# Patient Record
Sex: MALE | NOT HISPANIC OR LATINO | Employment: FULL TIME | URBAN - METROPOLITAN AREA
[De-identification: names, ages, dates, MRNs, and addresses within clinical notes are randomized per-mention and may not be internally consistent; named-entity substitution may affect disease eponyms.]

---

## 2020-10-22 ENCOUNTER — OFFICE VISIT (OUTPATIENT)
Dept: INTERNAL MEDICINE | Facility: CLINIC | Age: 30
End: 2020-10-22

## 2020-10-22 VITALS
TEMPERATURE: 96.8 F | DIASTOLIC BLOOD PRESSURE: 86 MMHG | HEIGHT: 73 IN | SYSTOLIC BLOOD PRESSURE: 122 MMHG | HEART RATE: 108 BPM | WEIGHT: 300 LBS | BODY MASS INDEX: 39.76 KG/M2 | RESPIRATION RATE: 16 BRPM | OXYGEN SATURATION: 98 %

## 2020-10-22 DIAGNOSIS — Z11.59 NEED FOR HEPATITIS C SCREENING TEST: ICD-10-CM

## 2020-10-22 DIAGNOSIS — Z00.00 ENCOUNTER FOR PREVENTIVE HEALTH EXAMINATION: Primary | ICD-10-CM

## 2020-10-22 DIAGNOSIS — Z71.1 CONCERN ABOUT STD IN MALE WITHOUT DIAGNOSIS: ICD-10-CM

## 2020-10-22 LAB
CLARITY, POC: CLEAR
COLOR UR: ABNORMAL
PH UR: 6.5 [PH] (ref 5–8)
PROT UR STRIP-MCNC: ABNORMAL MG/DL
RBC # UR STRIP: NEGATIVE /UL
SP GR UR: 1.02 (ref 1–1.03)

## 2020-10-22 PROCEDURE — 99385 PREV VISIT NEW AGE 18-39: CPT | Performed by: INTERNAL MEDICINE

## 2020-10-22 PROCEDURE — 81003 URINALYSIS AUTO W/O SCOPE: CPT | Performed by: INTERNAL MEDICINE

## 2020-10-22 NOTE — PROGRESS NOTES
Subjective   Gallito Doyle is a 30 y.o. male.     Chief Complaint   Patient presents with   • Annual Exam     New Patient         In today for initial visit, transition of care, and annual preventive exam.  Sleep is okay.  Gets 5 to 6 hours per night.  Exercises 3 to 4 days/week.  Energy is good.  Diet is not the best.       The following portions of the patient's history were reviewed and updated as appropriate: allergies, current medications, past social history and problem list.    No outpatient medications have been marked as taking for the 10/22/20 encounter (Office Visit) with Sincere Zhang MD.       Review of Systems   Constitutional: Negative for chills, diaphoresis, fatigue, fever and unexpected weight change.   HENT: Negative for congestion, ear pain, nosebleeds, rhinorrhea, sore throat and voice change.    Eyes: Negative for discharge, itching and visual disturbance.   Respiratory: Negative for cough, chest tightness, shortness of breath and wheezing.    Cardiovascular: Negative for chest pain, palpitations and leg swelling.   Gastrointestinal: Negative for abdominal pain, anal bleeding, constipation, diarrhea, nausea and vomiting.   Endocrine: Negative for cold intolerance, heat intolerance and polyuria.   Genitourinary: Negative for difficulty urinating, dysuria, frequency, hematuria and urgency.   Musculoskeletal: Negative for arthralgias, back pain and myalgias.   Skin: Negative for rash and wound.   Allergic/Immunologic: Positive for environmental allergies.   Neurological: Negative for dizziness, syncope, weakness, light-headedness and headaches.   Hematological: Negative for adenopathy. Does not bruise/bleed easily.   Psychiatric/Behavioral: Negative for confusion, dysphoric mood and sleep disturbance. The patient is not nervous/anxious.        Objective   Vitals:    10/22/20 1411   BP: 122/86   Pulse: 108   Resp: 16   Temp: 96.8 °F (36 °C)   SpO2: 98%      Wt Readings from Last 3  Encounters:   10/22/20 136 kg (300 lb)    Body mass index is 39.58 kg/m².      Physical Exam  Vitals signs and nursing note reviewed.   Constitutional:       Appearance: He is well-developed.   HENT:      Head: Normocephalic and atraumatic.      Right Ear: External ear normal.      Left Ear: External ear normal.      Nose: Nose normal.   Eyes:      General: No scleral icterus.     Conjunctiva/sclera: Conjunctivae normal.      Pupils: Pupils are equal, round, and reactive to light.   Neck:      Musculoskeletal: Normal range of motion and neck supple.      Thyroid: No thyromegaly.      Vascular: No JVD.   Cardiovascular:      Rate and Rhythm: Normal rate and regular rhythm.      Heart sounds: Normal heart sounds. No murmur. No friction rub. No gallop.    Pulmonary:      Effort: Pulmonary effort is normal. No respiratory distress.      Breath sounds: Normal breath sounds. No wheezing or rales.   Abdominal:      General: Bowel sounds are normal. There is no distension.      Palpations: Abdomen is soft. There is no mass.      Tenderness: There is no abdominal tenderness. There is no guarding or rebound.      Hernia: No hernia is present.   Musculoskeletal: Normal range of motion.   Lymphadenopathy:      Cervical: No cervical adenopathy.   Skin:     General: Skin is warm and dry.   Neurological:      Mental Status: He is alert and oriented to person, place, and time.      Deep Tendon Reflexes: Reflexes are normal and symmetric. Reflexes normal.   Psychiatric:         Behavior: Behavior normal.         Thought Content: Thought content normal.         Judgment: Judgment normal.           Problems Addressed this Visit     None      Visit Diagnoses     Encounter for preventive health examination    -  Primary    Relevant Orders    Glucose, Random    Lipid Panel With / Chol / HDL Ratio    Concern about STD in male without diagnosis        Relevant Orders    HIV-1 / O / 2 Ag / Antibody 4th Generation    RPR    Chlamydia  trachomatis, Neisseria gonorrhoeae, PCR - Urine, Urine, Clean Catch    Need for hepatitis C screening test        Relevant Orders    Hepatitis C Antibody      Diagnoses       Codes Comments    Encounter for preventive health examination    -  Primary ICD-10-CM: Z00.00  ICD-9-CM: V70.0     Concern about STD in male without diagnosis     ICD-10-CM: Z71.1  ICD-9-CM: V65.5     Need for hepatitis C screening test     ICD-10-CM: Z11.59  ICD-9-CM: V73.89         Assessment/Plan   In for initial visit, transition of care, and annual preventive exam.  Overall health is excellent.  He does smoke and is already accumulated a 13-pack-year smoking history.  We discussed quitting today.  He is not sure about last TD AP but had a child 4 years ago.  He will check the records at [place].  Flu shot is updated today.  Lab work will include glucose, lipids, and hep C antibody.  He also would like some STD checking and we will send off a panel.  No specific exposures.  Just worries.  He has noticed a scattering of tiny lesions along the distal shaft of his penis.  They are depigmented but protrudes slightly.  They may well be condylomatous.  Will refer to urology.  We will plan to follow-up annually.     PPE today includes face mask and eye shield.         Dragon disclaimer:   Much of this encounter note is an electronic transcription/translation of spoken language to printed text. The electronic translation of spoken language may permit erroneous, or at times, nonsensical words or phrases to be inadvertently transcribed; Although I have reviewed the note for such errors, some may still exist.

## 2020-10-22 NOTE — PATIENT INSTRUCTIONS
Exercising to Stay Healthy  To become healthy and stay healthy, it is recommended that you do moderate-intensity and vigorous-intensity exercise. You can tell that you are exercising at a moderate intensity if your heart starts beating faster and you start breathing faster but can still hold a conversation. You can tell that you are exercising at a vigorous intensity if you are breathing much harder and faster and cannot hold a conversation while exercising.  Exercising regularly is important. It has many health benefits, such as:  · Improving overall fitness, flexibility, and endurance.  · Increasing bone density.  · Helping with weight control.  · Decreasing body fat.  · Increasing muscle strength.  · Reducing stress and tension.  · Improving overall health.  How often should I exercise?  Choose an activity that you enjoy, and set realistic goals. Your health care provider can help you make an activity plan that works for you.  Exercise regularly as told by your health care provider. This may include:  · Doing strength training two times a week, such as:  ? Lifting weights.  ? Using resistance bands.  ? Push-ups.  ? Sit-ups.  ? Yoga.  · Doing a certain intensity of exercise for a given amount of time. Choose from these options:  ? A total of 150 minutes of moderate-intensity exercise every week.  ? A total of 75 minutes of vigorous-intensity exercise every week.  ? A mix of moderate-intensity and vigorous-intensity exercise every week.  Children, pregnant women, people who have not exercised regularly, people who are overweight, and older adults may need to talk with a health care provider about what activities are safe to do. If you have a medical condition, be sure to talk with your health care provider before you start a new exercise program.  What are some exercise ideas?  Moderate-intensity exercise ideas include:  · Walking 1 mile (1.6 km) in about 15  minutes.  · Biking.  · Hiking.  · Golfing.  · Dancing.  · Water aerobics.  Vigorous-intensity exercise ideas include:  · Walking 4.5 miles (7.2 km) or more in about 1 hour.  · Jogging or running 5 miles (8 km) in about 1 hour.  · Biking 10 miles (16.1 km) or more in about 1 hour.  · Lap swimming.  · Roller-skating or in-line skating.  · Cross-country skiing.  · Vigorous competitive sports, such as football, basketball, and soccer.  · Jumping rope.  · Aerobic dancing.  What are some everyday activities that can help me to get exercise?  · Yard work, such as:  ? Pushing a .  ? Raking and bagging leaves.  · Washing your car.  · Pushing a stroller.  · Shoveling snow.  · Gardening.  · Washing windows or floors.  How can I be more active in my day-to-day activities?  · Use stairs instead of an elevator.  · Take a walk during your lunch break.  · If you drive, park your car farther away from your work or school.  · If you take public transportation, get off one stop early and walk the rest of the way.  · Stand up or walk around during all of your indoor phone calls.  · Get up, stretch, and walk around every 30 minutes throughout the day.  · Enjoy exercise with a friend. Support to continue exercising will help you keep a regular routine of activity.  What guidelines can I follow while exercising?  · Before you start a new exercise program, talk with your health care provider.  · Do not exercise so much that you hurt yourself, feel dizzy, or get very short of breath.  · Wear comfortable clothes and wear shoes with good support.  · Drink plenty of water while you exercise to prevent dehydration or heat stroke.  · Work out until your breathing and your heartbeat get faster.  Where to find more information  · [place] Department of Health and Human Services: www.hhs.gov  · Centers for Disease Control and Prevention (CDC): www.cdc.gov  Summary  · Exercising regularly is important. It will improve your overall fitness,  flexibility, and endurance.  · Regular exercise also will improve your overall health. It can help you control your weight, reduce stress, and improve your bone density.  · Do not exercise so much that you hurt yourself, feel dizzy, or get very short of breath.  · Before you start a new exercise program, talk with your health care provider.  This information is not intended to replace advice given to you by your health care provider. Make sure you discuss any questions you have with your health care provider.  Document Released: 01/20/2012 Document Revised: 11/30/2018 Document Reviewed: 11/08/2018  [employer] Patient Education © 2020 [employer]  Calorie Counting for Weight Loss  Calories are units of energy. Your body needs a certain amount of calories from food to keep you going throughout the day. When you eat more calories than your body needs, your body stores the extra calories as fat. When you eat fewer calories than your body needs, your body burns fat to get the energy it needs.  Calorie counting means keeping track of how many calories you eat and drink each day. Calorie counting can be helpful if you need to lose weight. If you make sure to eat fewer calories than your body needs, you should lose weight. Ask your health care provider what a healthy weight is for you.  For calorie counting to work, you will need to eat the right number of calories in a day in order to lose a healthy amount of weight per week. A dietitian can help you determine how many calories you need in a day and will give you suggestions on how to reach your calorie goal.  · A healthy amount of weight to lose per week is usually 1-2 lb (0.5-0.9 kg). This usually means that your daily calorie intake should be reduced by 500-750 calories.  · Eating 1,200 - 1,500 calories per day can help most women lose weight.  · Eating 1,500 - 1,800 calories per day can help most men lose weight.  What is my plan?  My goal is to have __________  calories per day.  If I have this many calories per day, I should lose around __________ pounds per week.  What do I need to know about calorie counting?  In order to meet your daily calorie goal, you will need to:  · Find out how many calories are in each food you would like to eat. Try to do this before you eat.  · Decide how much of the food you plan to eat.  · Write down what you ate and how many calories it had. Doing this is called keeping a food log.  To successfully lose weight, it is important to balance calorie counting with a healthy lifestyle that includes regular activity. Aim for 150 minutes of moderate exercise (such as walking) or 75 minutes of vigorous exercise (such as running) each week.  Where do I find calorie information?    The number of calories in a food can be found on a Nutrition Facts label. If a food does not have a Nutrition Facts label, try to look up the calories online or ask your dietitian for help.  Remember that calories are listed per serving. If you choose to have more than one serving of a food, you will have to multiply the calories per serving by the amount of servings you plan to eat. For example, the label on a package of bread might say that a serving size is 1 slice and that there are 90 calories in a serving. If you eat 1 slice, you will have eaten 90 calories. If you eat 2 slices, you will have eaten 180 calories.  How do I keep a food log?  Immediately after each meal, record the following information in your food log:  · What you ate. Don't forget to include toppings, sauces, and other extras on the food.  · How much you ate. This can be measured in cups, ounces, or number of items.  · How many calories each food and drink had.  · The total number of calories in the meal.  Keep your food log near you, such as in a small notebook in your pocket, or use a mobile jayson or website. Some programs will calculate calories for you and show you how many calories you have left  "for the day to meet your goal.  What are some calorie counting tips?    · Use your calories on foods and drinks that will fill you up and not leave you hungry:  ? Some examples of foods that fill you up are nuts and nut butters, vegetables, lean proteins, and high-fiber foods like whole grains. High-fiber foods are foods with more than 5 g fiber per serving.  ? Drinks such as sodas, specialty coffee drinks, alcohol, and juices have a lot of calories, yet do not fill you up.  · Eat nutritious foods and avoid empty calories. Empty calories are calories you get from foods or beverages that do not have many vitamins or protein, such as candy, sweets, and soda. It is better to have a nutritious high-calorie food (such as an avocado) than a food with few nutrients (such as a bag of chips).  · Know how many calories are in the foods you eat most often. This will help you calculate calorie counts faster.  · Pay attention to calories in drinks. Low-calorie drinks include water and unsweetened drinks.  · Pay attention to nutrition labels for \"low fat\" or \"fat free\" foods. These foods sometimes have the same amount of calories or more calories than the full fat versions. They also often have added sugar, starch, or salt, to make up for flavor that was removed with the fat.  · Find a way of tracking calories that works for you. Get creative. Try different apps or programs if writing down calories does not work for you.  What are some portion control tips?  · Know how many calories are in a serving. This will help you know how many servings of a certain food you can have.  · Use a measuring cup to measure serving sizes. You could also try weighing out portions on a kitchen scale. With time, you will be able to estimate serving sizes for some foods.  · Take some time to put servings of different foods on your favorite plates, bowls, and cups so you know what a serving looks like.  · Try not to eat straight from a bag or box. " "Doing this can lead to overeating. Put the amount you would like to eat in a cup or on a plate to make sure you are eating the right portion.  · Use smaller plates, glasses, and bowls to prevent overeating.  · Try not to multitask (for example, watch TV or use your computer) while eating. If it is time to eat, sit down at a table and enjoy your food. This will help you to know when you are full. It will also help you to be aware of what you are eating and how much you are eating.  What are tips for following this plan?  Reading food labels  · Check the calorie count compared to the serving size. The serving size may be smaller than what you are used to eating.  · Check the source of the calories. Make sure the food you are eating is high in vitamins and protein and low in saturated and trans fats.  Shopping  · Read nutrition labels while you shop. This will help you make healthy decisions before you decide to purchase your food.  · Make a grocery list and stick to it.  Cooking  · Try to cook your favorite foods in a healthier way. For example, try baking instead of frying.  · Use low-fat dairy products.  Meal planning  · Use more fruits and vegetables. Half of your plate should be fruits and vegetables.  · Include lean proteins like poultry and fish.  How do I count calories when eating out?  · Ask for smaller portion sizes.  · Consider sharing an entree and sides instead of getting your own entree.  · If you get your own entree, eat only half. Ask for a box at the beginning of your meal and put the rest of your entree in it so you are not tempted to eat it.  · If calories are listed on the menu, choose the lower calorie options.  · Choose dishes that include vegetables, fruits, whole grains, low-fat dairy products, and lean protein.  · Choose items that are boiled, broiled, grilled, or steamed. Stay away from items that are buttered, battered, fried, or served with cream sauce. Items labeled \"crispy\" are usually " fried, unless stated otherwise.  · Choose water, low-fat milk, unsweetened iced tea, or other drinks without added sugar. If you want an alcoholic beverage, choose a lower calorie option such as a glass of wine or light beer.  · Ask for dressings, sauces, and syrups on the side. These are usually high in calories, so you should limit the amount you eat.  · If you want a salad, choose a garden salad and ask for grilled meats. Avoid extra toppings like hearn, cheese, or fried items. Ask for the dressing on the side, or ask for olive oil and vinegar or lemon to use as dressing.  · Estimate how many servings of a food you are given. For example, a serving of cooked rice is ½ cup or about the size of half a baseball. Knowing serving sizes will help you be aware of how much food you are eating at restaurants. The list below tells you how big or small some common portion sizes are based on everyday objects:  ? 1 oz--4 stacked dice.  ? 3 oz--1 deck of cards.  ? 1 tsp--1 die.  ? 1 Tbsp--½ a ping-pong ball.  ? 2 Tbsp--1 ping-pong ball.  ? ½ cup--½ baseball.  ? 1 cup--1 baseball.  Summary  · Calorie counting means keeping track of how many calories you eat and drink each day. If you eat fewer calories than your body needs, you should lose weight.  · A healthy amount of weight to lose per week is usually 1-2 lb (0.5-0.9 kg). This usually means reducing your daily calorie intake by 500-750 calories.  · The number of calories in a food can be found on a Nutrition Facts label. If a food does not have a Nutrition Facts label, try to look up the calories online or ask your dietitian for help.  · Use your calories on foods and drinks that will fill you up, and not on foods and drinks that will leave you hungry.  · Use smaller plates, glasses, and bowls to prevent overeating.  This information is not intended to replace advice given to you by your health care provider. Make sure you discuss any questions you have with your health care  "provider.  Document Released: 12/18/2006 Document Revised: 09/06/2019 Document Reviewed: 11/17/2017  [employer] Patient Education © 2020 [employer]  BMI for Adults  What is BMI?  Body mass index (BMI) is a number that is calculated from a person's weight and height. BMI can help estimate how much of a person's weight is composed of fat. BMI does not measure body fat directly. Rather, it is an alternative to procedures that directly measure body fat, which can be difficult and expensive.  BMI can help identify people who may be at higher risk for certain medical problems.  What are BMI measurements used for?  BMI is used as a screening tool to identify possible weight problems. It helps determine whether a person is obese, overweight, a healthy weight, or underweight.  BMI is useful for:  · Identifying a weight problem that may be related to a medical condition or may increase the risk for medical problems.  · Promoting changes, such as changes in diet and exercise, to help reach a healthy weight. BMI screening can be repeated to see if these changes are working.  How is BMI calculated?  BMI involves measuring your weight in relation to your height. Both height and weight are measured, and the BMI is calculated from those numbers. This can be done either in English ([place] or metric measurements. Note that charts and online BMI calculators are available to help you find your BMI quickly and easily without having to do these calculations yourself.  To calculate your BMI in English ([place] measurements:    1. Measure your weight in pounds (lb).  2. Multiply the number of pounds by 703.  ? For example, for a person who weighs 180 lb, multiply that number by 703, which equals 126,540.  3. Measure your height in inches. Then multiply that number by itself to get a measurement called \"inches squared.\"  ? For example, for a person who is 70 inches tall, the \"inches squared\" measurement is 70 inches x 70 inches, which equals " "4,900 inches squared.  4. Divide the total from step 2 (number of lb x 703) by the total from step 3 (inches squared): 126,540 ÷ 4,900 = 25.8. This is your BMI.  To calculate your BMI in metric measurements:  1. Measure your weight in kilograms (kg).  2. Measure your height in meters (m). Then multiply that number by itself to get a measurement called \"meters squared.\"  ? For example, for a person who is 1.75 m tall, the \"meters squared\" measurement is 1.75 m x 1.75 m, which is equal to 3.1 meters squared.  3. Divide the number of kilograms (your weight) by the meters squared number. In this example: 70 ÷ 3.1 = 22.6. This is your BMI.  What do the results mean?  BMI charts are used to identify whether you are underweight, normal weight, overweight, or obese. The following guidelines will be used:  · Underweight: BMI less than 18.5.  · Normal weight: BMI between 18.5 and 24.9.  · Overweight: BMI between 25 and 29.9.  · Obese: BMI of 30 or above.  Keep these notes in mind:  · Weight includes both fat and muscle, so someone with a muscular build, such as an athlete, may have a BMI that is higher than 24.9. In cases like these, BMI is not an accurate measure of body fat.  · To determine if excess body fat is the cause of a BMI of 25 or higher, further assessments may need to be done by a health care provider.  · BMI is usually interpreted in the same way for men and women.  Where to find more information  For more information about BMI, including tools to quickly calculate your BMI, go to these websites:  · Centers for Disease Control and Prevention: www.cdc.gov  · American Heart Association: www.heart.org  · National Heart, Lung, and [facility] [place]: www.nhlbi.nih.gov  Summary  · Body mass index (BMI) is a number that is calculated from a person's weight and height.  · BMI may help estimate how much of a person's weight is composed of fat. BMI can help identify those who may be at higher risk for certain medical " problems.  · BMI can be measured using English measurements or metric measurements.  · BMI charts are used to identify whether you are underweight, normal weight, overweight, or obese.  This information is not intended to replace advice given to you by your health care provider. Make sure you discuss any questions you have with your health care provider.  Document Released: 08/29/2005 Document Revised: 09/09/2020 Document Reviewed: 07/17/2020  [employer] Patient Education © 2020 [employer]

## 2020-10-24 LAB
C TRACH RRNA SPEC QL NAA+PROBE: NEGATIVE
CHOLEST SERPL-MCNC: 183 MG/DL (ref 0–200)
CHOLEST/HDLC SERPL: 4.46 {RATIO}
GLUCOSE SERPL-MCNC: 99 MG/DL (ref 65–99)
HCV AB S/CO SERPL IA: <0.1 S/CO RATIO (ref 0–0.9)
HDLC SERPL-MCNC: 41 MG/DL (ref 40–60)
HIV 1+2 AB+HIV1 P24 AG SERPL QL IA: NON REACTIVE
LDLC SERPL CALC-MCNC: 101 MG/DL (ref 0–100)
N GONORRHOEA RRNA SPEC QL NAA+PROBE: NEGATIVE
RPR SER QL: NORMAL
TRIGL SERPL-MCNC: 237 MG/DL (ref 0–150)
VLDLC SERPL CALC-MCNC: 41 MG/DL (ref 5–40)

## 2020-11-17 ENCOUNTER — LAB REQUISITION (OUTPATIENT)
Dept: LAB | Facility: HOSPITAL | Age: 30
End: 2020-11-17

## 2020-11-17 DIAGNOSIS — N50.9 DISORDER OF MALE GENITAL ORGANS, UNSPECIFIED: ICD-10-CM

## 2020-11-17 PROCEDURE — 88305 TISSUE EXAM BY PATHOLOGIST: CPT | Performed by: UROLOGY

## 2020-11-18 LAB
LAB AP CASE REPORT: NORMAL
LAB AP DIAGNOSIS COMMENT: NORMAL
PATH REPORT.FINAL DX SPEC: NORMAL
PATH REPORT.GROSS SPEC: NORMAL

## 2021-06-11 ENCOUNTER — HOSPITAL ENCOUNTER (EMERGENCY)
Facility: HOSPITAL | Age: 31
Discharge: HOME | End: 2021-06-11
Attending: EMERGENCY MEDICINE | Admitting: EMERGENCY MEDICINE

## 2021-06-11 VITALS
BODY MASS INDEX: 39.58 KG/M2 | TEMPERATURE: 98 F | RESPIRATION RATE: 20 BRPM | HEART RATE: 88 BPM | OXYGEN SATURATION: 98 % | DIASTOLIC BLOOD PRESSURE: 80 MMHG | HEIGHT: 73 IN | SYSTOLIC BLOOD PRESSURE: 144 MMHG

## 2021-06-11 DIAGNOSIS — S86.112D GASTROCNEMIUS TEAR, LEFT, SUBSEQUENT ENCOUNTER: Primary | ICD-10-CM

## 2021-06-11 LAB
ANION GAP SERPL CALCULATED.3IONS-SCNC: 9.4 MMOL/L (ref 5–15)
BASOPHILS # BLD AUTO: 0.04 10*3/MM3 (ref 0–0.2)
BASOPHILS NFR BLD AUTO: 0.4 % (ref 0–1.5)
BUN SERPL-MCNC: 15 MG/DL (ref 6–20)
BUN/CREAT SERPL: 18.3 (ref 7–25)
CALCIUM SPEC-SCNC: 9.4 MG/DL (ref 8.6–10.5)
CHLORIDE SERPL-SCNC: 104 MMOL/L (ref 98–107)
CO2 SERPL-SCNC: 25.6 MMOL/L (ref 22–29)
CREAT SERPL-MCNC: 0.82 MG/DL (ref 0.76–1.27)
D DIMER PPP FEU-MCNC: <0.27 MCGFEU/ML (ref 0–0.49)
DEPRECATED RDW RBC AUTO: 43.5 FL (ref 37–54)
EOSINOPHIL # BLD AUTO: 0.25 10*3/MM3 (ref 0–0.4)
EOSINOPHIL NFR BLD AUTO: 2.3 % (ref 0.3–6.2)
ERYTHROCYTE [DISTWIDTH] IN BLOOD BY AUTOMATED COUNT: 13.7 % (ref 12.3–15.4)
GFR SERPL CREATININE-BSD FRML MDRD: 110 ML/MIN/1.73
GFR SERPL CREATININE-BSD FRML MDRD: 134 ML/MIN/1.73
GLUCOSE SERPL-MCNC: 90 MG/DL (ref 65–99)
HCT VFR BLD AUTO: 42.5 % (ref 37.5–51)
HGB BLD-MCNC: 14.2 G/DL (ref 13–17.7)
IMM GRANULOCYTES # BLD AUTO: 0.04 10*3/MM3 (ref 0–0.05)
IMM GRANULOCYTES NFR BLD AUTO: 0.4 % (ref 0–0.5)
LYMPHOCYTES # BLD AUTO: 2.83 10*3/MM3 (ref 0.7–3.1)
LYMPHOCYTES NFR BLD AUTO: 25.6 % (ref 19.6–45.3)
MCH RBC QN AUTO: 29 PG (ref 26.6–33)
MCHC RBC AUTO-ENTMCNC: 33.4 G/DL (ref 31.5–35.7)
MCV RBC AUTO: 86.9 FL (ref 79–97)
MONOCYTES # BLD AUTO: 0.61 10*3/MM3 (ref 0.1–0.9)
MONOCYTES NFR BLD AUTO: 5.5 % (ref 5–12)
NEUTROPHILS NFR BLD AUTO: 65.8 % (ref 42.7–76)
NEUTROPHILS NFR BLD AUTO: 7.29 10*3/MM3 (ref 1.7–7)
NRBC BLD AUTO-RTO: 0 /100 WBC (ref 0–0.2)
PLATELET # BLD AUTO: 239 10*3/MM3 (ref 140–450)
PMV BLD AUTO: 10.6 FL (ref 6–12)
POTASSIUM SERPL-SCNC: 3.9 MMOL/L (ref 3.5–5.2)
RBC # BLD AUTO: 4.89 10*6/MM3 (ref 4.14–5.8)
SODIUM SERPL-SCNC: 139 MMOL/L (ref 136–145)
WBC # BLD AUTO: 11.06 10*3/MM3 (ref 3.4–10.8)

## 2021-06-11 PROCEDURE — 85379 FIBRIN DEGRADATION QUANT: CPT | Performed by: PHYSICIAN ASSISTANT

## 2021-06-11 PROCEDURE — 80048 BASIC METABOLIC PNL TOTAL CA: CPT | Performed by: PHYSICIAN ASSISTANT

## 2021-06-11 PROCEDURE — 85025 COMPLETE CBC W/AUTO DIFF WBC: CPT | Performed by: PHYSICIAN ASSISTANT

## 2021-06-11 PROCEDURE — 36415 COLL VENOUS BLD VENIPUNCTURE: CPT

## 2021-06-11 PROCEDURE — 99282 EMERGENCY DEPT VISIT SF MDM: CPT

## 2021-06-11 RX ORDER — DICLOFENAC SODIUM 75 MG/1
75 TABLET, DELAYED RELEASE ORAL 2 TIMES DAILY
Qty: 14 TABLET | Refills: 0 | Status: SHIPPED | OUTPATIENT
Start: 2021-06-11 | End: 2021-06-18

## 2021-06-12 NOTE — ED PROVIDER NOTES
Pt presents to the ED complaining of left calf pain.  He states he had some pain in his left calf on and off for the past 2 weeks but tonight when he was playing basketball he felt like something hit him in the back of the leg however there is nothing around him.  Now he states it hurts to ambulate.  He went to the [facility] who told him he had a blood clot and sent him to the emergency room for evaluation.    On exam, pt is alert and orient x3 no acute distress  Patient has point tenderness over the medial head of his left gastrocnemius on examination  Patient's Achilles tendon is intact  The patient has no calf swelling  His left foot is neurovascular intact    I agree with midlevel plan to check a D-dimer    PPE  Pt does not present with symptoms for COVID19; however, I was wearing a mask and goggles throughout all patient interaction.    The patient's D-dimer is negative.  I advised the patient he has a gastrocnemius tear.  We will place an Ace wrap putting on crutches give him NSAIDs and have him follow-up with orthopedics.  Patient understands and agrees with the plan.    The DAVID and I have discussed this patient's history, physical exam, and treatment plan.  I have reviewed the documentation and personally had a face to face interaction with the patient. I affirm the documentation and agree with the treatment and plan.  The attached note describes my personal findings.           John Bragg MD  06/11/21 7283

## 2021-06-12 NOTE — ED TRIAGE NOTES
Patient reports left leg pain x 2 weeks much worse today with NKI. Patient denies shortness of breath or chest pain.     Patient placed in mask upon arrival, Triage staff wearing appropriate PPE.

## 2021-06-12 NOTE — DISCHARGE INSTRUCTIONS
Please take the prescription as prescribed.  Ice and elevate your legs 2-3 times a day.  Use your crutches to help alleviate your pain.  Follow-up with Dr. Tinoco if your symptoms continue.

## 2021-06-12 NOTE — ED PROVIDER NOTES
EMERGENCY DEPARTMENT ENCOUNTER    Room Number:  35/35  Date seen:  6/12/2021  Time seen: 21:51 EDT  PCP: Sincere Zhang MD  Historian: Patient    HPI:  Chief complaint: Leg pain  A complete HPI/ROS/PMH/PSH/SH/FH are unobtainable due to: None  Context:Gallito Doyle is a 30 y.o. male, who presents to the ED with c/o intermittent left calf pain for 2 weeks.  He was playing basketball earlier today when the pain started to get worse and noticed some mild swelling around the area.  He went to urgent care and was sent to the ER for further evaluation for DVT.  Patient denies taking any hormones, denies any prolonged immobilization, denies any history of blood clots.  Patient denies any chest pain or shortness of breath.  When he bears weight the pain is a 9 of 10 pain scale.    Patient was placed in face mask in first look. Patient was wearing facemask when I entered the room and throughout our encounter. I wore full protective equipment throughout this patient encounter including a face mask, goggles, and gloves. Hand hygiene was performed before donning protective equipment and after removal when leaving the room.      MEDICAL RECORD REVIEW      ALLERGIES  Patient has no known allergies.    PAST MEDICAL HISTORY  Active Ambulatory Problems     Diagnosis Date Noted   • No Active Ambulatory Problems     Resolved Ambulatory Problems     Diagnosis Date Noted   • No Resolved Ambulatory Problems     No Additional Past Medical History       PAST SURGICAL HISTORY  No past surgical history on file.    FAMILY HISTORY  Family History   Problem Relation Age of Onset   • No Known Problems Sister    • No Known Problems Brother    • No Known Problems Daughter    • No Known Problems Daughter        SOCIAL HISTORY  Social History     Socioeconomic History   • Marital status: Single     Spouse name: Not on file   • Number of children: Not on file   • Years of education: Not on file   • Highest education level: Not on file   Tobacco  Use   • Smoking status: Current Every Day Smoker     Packs/day: 1.00     Types: Cigarettes     Start date: 2007   • Smokeless tobacco: Never Used   Substance and Sexual Activity   • Alcohol use: Yes   • Drug use: Not Currently       REVIEW OF SYSTEMS  Review of Systems    All systems reviewed and negative except for those discussed in HPI.     PHYSICAL EXAM    ED Triage Vitals [06/11/21 2023]   Temp Heart Rate Resp BP SpO2   97.8 °F (36.6 °C) 90 17 -- 98 %      Temp src Heart Rate Source Patient Position BP Location FiO2 (%)   Tympanic -- -- -- --     Physical Exam    I have reviewed the triage vital signs and nursing notes.      GENERAL: not distressed  HENT: nares patent  EYES: no scleral icterus  NECK: no ROM limitations  CV: regular rhythm, regular rate  RESPIRATORY: normal effort  ABDOMEN: soft, nontender  : deferred  MUSCULOSKELETAL: no deformity, there is point tenderness over the medial head of his left gastrocnemius on examination, left lower extremity Achilles tendon is intact, 2+ dorsalis pedal pulses to the left lower extremity  NEURO: alert, moves all extremities, follows commands  SKIN: warm, dry    LAB RESULTS  Recent Results (from the past 24 hour(s))   Basic Metabolic Panel    Collection Time: 06/11/21 10:07 PM    Specimen: Blood   Result Value Ref Range    Glucose 90 65 - 99 mg/dL    BUN 15 6 - 20 mg/dL    Creatinine 0.82 0.76 - 1.27 mg/dL    Sodium 139 136 - 145 mmol/L    Potassium 3.9 3.5 - 5.2 mmol/L    Chloride 104 98 - 107 mmol/L    CO2 25.6 22.0 - 29.0 mmol/L    Calcium 9.4 8.6 - 10.5 mg/dL    eGFR  African Amer 134 >60 mL/min/1.73    eGFR Non African Amer 110 >60 mL/min/1.73    BUN/Creatinine Ratio 18.3 7.0 - 25.0    Anion Gap 9.4 5.0 - 15.0 mmol/L   D-dimer, Quantitative    Collection Time: 06/11/21 10:07 PM    Specimen: Blood   Result Value Ref Range    D-Dimer, Quantitative <0.27 0.00 - 0.49 MCGFEU/mL   CBC Auto Differential    Collection Time: 06/11/21 10:07 PM    Specimen: Blood    Result Value Ref Range    WBC 11.06 (H) 3.40 - 10.80 10*3/mm3    RBC 4.89 4.14 - 5.80 10*6/mm3    Hemoglobin 14.2 13.0 - 17.7 g/dL    Hematocrit 42.5 37.5 - 51.0 %    MCV 86.9 79.0 - 97.0 fL    MCH 29.0 26.6 - 33.0 pg    MCHC 33.4 31.5 - 35.7 g/dL    RDW 13.7 12.3 - 15.4 %    RDW-SD 43.5 37.0 - 54.0 fl    MPV 10.6 6.0 - 12.0 fL    Platelets 239 140 - 450 10*3/mm3    Neutrophil % 65.8 42.7 - 76.0 %    Lymphocyte % 25.6 19.6 - 45.3 %    Monocyte % 5.5 5.0 - 12.0 %    Eosinophil % 2.3 0.3 - 6.2 %    Basophil % 0.4 0.0 - 1.5 %    Immature Grans % 0.4 0.0 - 0.5 %    Neutrophils, Absolute 7.29 (H) 1.70 - 7.00 10*3/mm3    Lymphocytes, Absolute 2.83 0.70 - 3.10 10*3/mm3    Monocytes, Absolute 0.61 0.10 - 0.90 10*3/mm3    Eosinophils, Absolute 0.25 0.00 - 0.40 10*3/mm3    Basophils, Absolute 0.04 0.00 - 0.20 10*3/mm3    Immature Grans, Absolute 0.04 0.00 - 0.05 10*3/mm3    nRBC 0.0 0.0 - 0.2 /100 WBC         RADIOLOGY RESULTS  No orders to display         PROGRESS, DATA ANALYSIS, CONSULTS AND MEDICAL DECISION MAKING  All labs have been independently reviewed by me.  All radiology studies have been reviewed by me and discussed with radiologist dictating the report. Discussion below represents my analysis of pertinent findings related to patient's condition, differential diagnosis, treatment plan and final disposition.          The differential diagnosis include but are not limited to gastrocnemius tear, fracture, Achilles tendon rupture.       Reviewed pt's history and workup with Dr. Bragg.  After a bedside evaluation, Dr. Bragg agrees with the plan of care.    (FOR DISCHARGE)The patient's history, physical exam, and lab findings were discussed with the physician, who also performed a face to face history and physical exam.  I discussed all results and noted any abnormalities with patient.  Discussed absoute need to recheck abnormalities with their family physician.  I answered any of the patient's questions.  Discussed  "plan for discharge, as there is no emergent indication for admission.  Pt is agreeable and understands need for follow up and repeat testing.  Pt is aware that discharge does not mean that nothing is wrong but it indicates no emergency is present and they must continue care with their family physician.  Pt is discharged with instructions to follow up with primary care doctor to have their blood pressure rechecked.       Disposition vitals:  /80   Pulse 88   Temp 98 °F (36.7 °C)   Resp 20   Ht 185.4 cm (73\")   SpO2 98%   BMI 39.58 kg/m²       DIAGNOSIS  Final diagnoses:   Gastrocnemius tear, left, subsequent encounter       FOLLOW UP   Alexandre Tinoco MD  [address] [id number] [phone]    Call in 1 day      [facility] Emergency Department  [address] [facility] [address] [phone]    If symptoms worsen, As needed         Cheryl Bravo PA-C  06/12/21 0613    "

## 2021-06-16 ENCOUNTER — OFFICE VISIT (OUTPATIENT)
Dept: ORTHOPEDIC SURGERY | Facility: CLINIC | Age: 31
End: 2021-06-16

## 2021-06-16 VITALS — WEIGHT: 290 LBS | BODY MASS INDEX: 38.43 KG/M2 | TEMPERATURE: 96.6 F | HEIGHT: 73 IN

## 2021-06-16 DIAGNOSIS — M79.662 PAIN IN LEFT LOWER LEG: Primary | ICD-10-CM

## 2021-06-16 PROCEDURE — 99202 OFFICE O/P NEW SF 15 MIN: CPT | Performed by: ORTHOPAEDIC SURGERY

## 2021-06-16 PROCEDURE — 73590 X-RAY EXAM OF LOWER LEG: CPT | Performed by: ORTHOPAEDIC SURGERY

## 2021-06-16 RX ORDER — IBUPROFEN 800 MG/1
TABLET ORAL
COMMUNITY
Start: 2021-06-13 | End: 2022-04-18

## 2021-06-16 NOTE — PROGRESS NOTES
"  Patient: Gallito Doyle    YOB: 1990    Medical Record Number: [id number]    Chief Complaints: Left calf injury    History of Present Illness:     30 y.o. male patient who presents for his left calf.  2 weeks ago he was playing basketball.  He went up for a rebound and when he came down he tells me \"it felt like I got hit\".  He felt a sharp pain in the back of his calf.  He could hardly bear weight for a period of several days afterwards.  Today, his pain is much better.  Current pain is mild and aching.  Localizes the pain to the posterior medial calf.  He is able to ambulate without any assist device.    Allergies: No Known Allergies    Home Medications:      Current Outpatient Medications:   •  diclofenac (VOLTAREN) 75 MG EC tablet, Take 1 tablet by mouth 2 (Two) Times a Day for 7 days., Disp: 14 tablet, Rfl: 0  •  ibuprofen (ADVIL,MOTRIN) 800 MG tablet, , Disp: , Rfl:     History reviewed. No pertinent past medical history.    History reviewed. No pertinent surgical history.    Social History     Occupational History   • Not on file   Tobacco Use   • Smoking status: Current Every Day Smoker     Packs/day: 1.00     Types: Cigarettes     Start date: 2007   • Smokeless tobacco: Never Used   Substance and Sexual Activity   • Alcohol use: Yes   • Drug use: Not Currently   • Sexual activity: Not on file      Social History     Social History Narrative   • Not on file       Family History   Problem Relation Age of Onset   • No Known Problems Sister    • No Known Problems Brother    • No Known Problems Daughter    • No Known Problems Daughter        Review of Systems:      Constitutional: Denies fever, shaking or chills   Eyes: Denies change in visual acuity   HEENT: Denies nasal congestion or sore throat   Respiratory: Denies cough or shortness of breath   Cardiovascular: Denies chest pain or edema  Endocrine: Denies tremors, palpitations, intolerance of heat or cold, polyuria, polydipsia.  GI: " "Denies abdominal pain, nausea, vomiting, bloody stools or diarrhea  : Denies frequency, urgency, incontinence, retention, or nocturia.  Musculoskeletal: Denies numbness, tingling or loss of motor function except as above  Integument: Denies rash, lesion or ulceration   Neurologic: Denies headache or focal weakness, deficits  Heme: Denies spontaneous or excessive bleeding, epistaxis, hematuria, melena, fatigue, enlarged or tender lymph nodes.      All other pertinent positives and negatives as noted above in HPI.    Physical Exam: 30 y.o. male    Vitals:    06/16/21 1506   Temp: 96.6 °F (35.9 °C)   Weight: 132 kg (290 lb)   Height: 185.4 cm (73\")       General:  Patient is awake and alert.  Appears in no acute distress or discomfort.    Psych:  Affect and demeanor are appropriate.    Eyes:  Conjunctiva and sclera appear grossly normal.  Eyes track well and EOM seem to be intact.    Ears:  No gross abnormalities.  Hearing adequate for the exam.    Cardiovascular:  Regular rate and rhythm.    Lungs:  Good chest expansion.  Breathing unlabored.    Lymph:  No palpable masses or adenopathy in the left lower extremity    Extremities: Left leg is examined.  Skin at his calf is benign.  No edema or ecchymosis.  Mild tenderness over the lower muscle body of the medial head of the gastroc.  No palpable defect or step-off.  His Achilles is palpably intact.  His foot moves normally with a Bhat test.  He can perform a single-leg toe rise although it is uncomfortable for him.  He has flatfoot deformity but it is passively correctable.  He can plantarflex and dorsiflex with good strength.  Plantarflexion is just mildly uncomfortable.  He has intact sensation throughout his foot.  Brisk capillary refill.  Good skin turgor.         Radiology:   AP and lateral views left tibia and fibula are ordered and reviewed to evaluate his complaint.  No comparison films are available.  The x-rays show no obvious acute abnormalities, " lesions, masses or other concerning findings.    Assessment/Plan: Left medial head of gastroc muscle strain    His Achilles is intact.  It seems that he had a gastroc muscle strain.  This should continue to get better with time.  We discussed relative rest, icing and anti-inflammatories as needed.  He will follow-up on an as-needed basis.    Alexandre Tinoco MD    06/16/2021    CC to Sincere Zhang MD

## 2021-10-11 ENCOUNTER — TELEPHONE (OUTPATIENT)
Dept: INTERNAL MEDICINE | Facility: CLINIC | Age: 31
End: 2021-10-11

## 2021-10-11 DIAGNOSIS — L50.9 HIVES: Primary | ICD-10-CM

## 2021-10-11 NOTE — TELEPHONE ENCOUNTER
Caller: Gallito Doyle    Relationship: Self    Best call back number:[phone]     What is the medical concern/diagnosis: BREAKING OUT IN HIVES. NOT SURE WHAT CAUSES IT. HAPPENED A FEW YEARS AGO AND IS NOW HAPPENING AGAIN NOW. USING OTC ANTI ITCH AND IT IS HELPING, BUT NOT GOING AWAY. WAS SEEN AT [facility] YESTERDAY AND WAS GIVEN PREDNISONE     What specialty or service is being requested: ALLERGIST

## 2021-10-25 ENCOUNTER — OFFICE VISIT (OUTPATIENT)
Dept: INTERNAL MEDICINE | Facility: CLINIC | Age: 31
End: 2021-10-25

## 2021-10-25 VITALS
RESPIRATION RATE: 18 BRPM | HEART RATE: 101 BPM | SYSTOLIC BLOOD PRESSURE: 132 MMHG | OXYGEN SATURATION: 98 % | BODY MASS INDEX: 39.76 KG/M2 | DIASTOLIC BLOOD PRESSURE: 80 MMHG | WEIGHT: 300 LBS | TEMPERATURE: 98 F | HEIGHT: 73 IN

## 2021-10-25 DIAGNOSIS — Z00.00 ENCOUNTER FOR PREVENTIVE HEALTH EXAMINATION: Primary | ICD-10-CM

## 2021-10-25 DIAGNOSIS — Z71.1 CONCERN ABOUT STD IN MALE WITHOUT DIAGNOSIS: ICD-10-CM

## 2021-10-25 PROCEDURE — 99395 PREV VISIT EST AGE 18-39: CPT | Performed by: INTERNAL MEDICINE

## 2021-10-25 NOTE — PROGRESS NOTES
Subjective   Gallito Doyle is a 31 y.o. male.     Chief Complaint   Patient presents with   • Annual Exam   • Abdominal Pain     IBS         In today for annual preventive exam.  Sleep is okay.  Gets 6 hours per night.  Exercises 2 days/week.  Energy is good.  Diet is not the best.    Abdominal Pain  Pertinent negatives include no arthralgias, constipation, diarrhea, dysuria, fever, frequency, headaches, hematuria, myalgias, nausea or vomiting.        The following portions of the patient's history were reviewed and updated as appropriate: allergies, current medications, past social history and problem list.    No outpatient medications have been marked as taking for the 10/25/21 encounter (Office Visit) with Sincere Zhang MD.       Review of Systems   Constitutional: Negative for chills, diaphoresis, fatigue, fever and unexpected weight change.   Respiratory: Negative for cough, chest tightness, shortness of breath and wheezing.    Cardiovascular: Negative for chest pain, palpitations and leg swelling.   Gastrointestinal: Positive for abdominal distention (flatus). Negative for abdominal pain, anal bleeding, blood in stool, constipation, diarrhea, nausea, rectal pain and vomiting.   Endocrine: Negative for cold intolerance, heat intolerance and polyuria.   Genitourinary: Negative for difficulty urinating, dysuria, frequency, hematuria and urgency.   Musculoskeletal: Negative for arthralgias, back pain and myalgias.   Allergic/Immunologic: Positive for environmental allergies.   Neurological: Negative for dizziness, syncope, weakness, light-headedness and headaches.   Hematological: Negative for adenopathy. Does not bruise/bleed easily.   Psychiatric/Behavioral: Negative for confusion, dysphoric mood and sleep disturbance. The patient is not nervous/anxious.        Objective   Vitals:    10/25/21 1028   BP: 132/80   Pulse: 101   Resp: 18   Temp: 98 °F (36.7 °C)   SpO2: 98%      Wt Readings from Last 3  Encounters:   10/25/21 136 kg (300 lb)   06/16/21 132 kg (290 lb)   10/22/20 136 kg (300 lb)    Body mass index is 39.58 kg/m².      Physical Exam  Vitals and nursing note reviewed.   Constitutional:       Appearance: Normal appearance. He is well-developed.   HENT:      Head: Normocephalic and atraumatic.      Right Ear: External ear normal.      Left Ear: External ear normal.      Nose: Nose normal.   Eyes:      General: No scleral icterus.     Conjunctiva/sclera: Conjunctivae normal.      Pupils: Pupils are equal, round, and reactive to light.   Neck:      Thyroid: No thyromegaly.      Vascular: No JVD.   Cardiovascular:      Rate and Rhythm: Normal rate and regular rhythm.      Heart sounds: Normal heart sounds. No murmur heard.  No friction rub. No gallop.    Pulmonary:      Effort: Pulmonary effort is normal. No respiratory distress.      Breath sounds: Normal breath sounds. No wheezing or rales.   Abdominal:      General: Bowel sounds are normal. There is no distension.      Palpations: Abdomen is soft. There is no mass.      Tenderness: There is no abdominal tenderness. There is no guarding or rebound.      Hernia: No hernia is present.   Musculoskeletal:         General: Normal range of motion.      Cervical back: Normal range of motion and neck supple.   Lymphadenopathy:      Cervical: No cervical adenopathy.   Skin:     General: Skin is warm and dry.      Findings: Bruising: .END.   Neurological:      General: No focal deficit present.      Mental Status: He is alert and oriented to person, place, and time.      Deep Tendon Reflexes: Reflexes are normal and symmetric. Reflexes normal.   Psychiatric:         Mood and Affect: Mood normal.         Behavior: Behavior normal.         Thought Content: Thought content normal.         Judgment: Judgment normal.           Problems Addressed this Visit     None      Visit Diagnoses     Encounter for preventive health examination    -  Primary      Diagnoses        Codes Comments    Encounter for preventive health examination    -  Primary ICD-10-CM: Z00.00  ICD-9-CM: V70.0         Assessment/Plan   In for annual preventive exam.  Overall health is excellent.  He does smoke and is already accumulated a 14-pack-year smoking history.  Smokes about three quarters of a pack a day for 13 years.  We discussed quitting today.  He is not sure about last TD AP but had a child 4 years ago.  Flu shot is declined today.  Lab work today will include glucose and lipids.  He also would like some STD checking and we will send off a panel.  No specific exposures.  Just worries.  We will plan to follow-up annually.  He is concerned about chronic flatus and suggested he try elimination activities with diet as this is usually a dietary phenomenon.    Prevention counseling was performed today. The counseling performed was routine health maintenance topics including BMI and exercise.         Dragon disclaimer:   Much of this encounter note is an electronic transcription/translation of spoken language to printed text. The electronic translation of spoken language may permit erroneous, or at times, nonsensical words or phrases to be inadvertently transcribed; Although I have reviewed the note for such errors, some may still exist.

## 2021-10-26 LAB
C TRACH RRNA SPEC QL NAA+PROBE: NEGATIVE
CHOLEST SERPL-MCNC: 205 MG/DL (ref 100–199)
CHOLEST/HDLC SERPL: 5.4 RATIO (ref 0–5)
GLUCOSE SERPL-MCNC: 99 MG/DL (ref 65–99)
HDLC SERPL-MCNC: 38 MG/DL
HIV 1+2 AB+HIV1 P24 AG SERPL QL IA: NON REACTIVE
LDLC SERPL CALC-MCNC: 117 MG/DL (ref 0–99)
N GONORRHOEA RRNA SPEC QL NAA+PROBE: NEGATIVE
RPR SER QL: NON REACTIVE
TRIGL SERPL-MCNC: 287 MG/DL (ref 0–149)
VLDLC SERPL CALC-MCNC: 50 MG/DL (ref 5–40)

## 2022-04-01 ENCOUNTER — TELEPHONE (OUTPATIENT)
Dept: INTERNAL MEDICINE | Facility: CLINIC | Age: 32
End: 2022-04-01

## 2022-04-01 NOTE — TELEPHONE ENCOUNTER
LM to return call. Need to find out where, when & who ordered this MRI. Not records of any visits at [facility] or none scanned in about MRI.

## 2022-04-01 NOTE — TELEPHONE ENCOUNTER
Spoke to patient, he reports [facility]. MRI back & neck was completed 3/11/22. Phone [phone]. Fax [phone]. Address is [facility] [address]    Spoke to Gayle with location & requested we fax a request to [phone]. Request faxed today.

## 2022-04-01 NOTE — TELEPHONE ENCOUNTER
Caller: Gallito Doyle    Relationship: Self    Best call back number: [phone]    What orders are you requesting (i.e. lab or imaging): ULTRASOUND OF KIDNEYS    In what timeframe would the patient need to come in: AS SOON AS POSSIBLE    Where will you receive your lab/imaging services: [facility]    Additional notes: PATIENT STATED THAT HE HAD AN MRI DONE APPROX TWO WEEKS AGO.  PATIENT STATED THAT HE WAS TOLD TO HAVE HIS PCP PUT AN ORDER IN FOR AN ULTRASOUND.

## 2022-04-05 NOTE — TELEPHONE ENCOUNTER
I spoke with the patient and informed him to wait another week, that it usually takes a week or two for the scheduling department to call and set this up.Patient verbally understood.

## 2022-04-05 NOTE — TELEPHONE ENCOUNTER
Caller: Gallito Doyle    Relationship: Self    Best call back number: [phone]    What was the call regarding: PATIENT IS CALLING IN REGARDING AN ULTRASOUND THAT HE NEEDS TO HAVE DONE. PATIENT STATED HE LAST SPOKE TO SOMEONE ON Friday AND HAS NOT HEARD ANYTHING SINCE. [facility]LEASE [facility].    Do you require a callback: YES

## 2022-04-06 ENCOUNTER — TELEPHONE (OUTPATIENT)
Dept: INTERNAL MEDICINE | Facility: CLINIC | Age: 32
End: 2022-04-06

## 2022-04-06 NOTE — TELEPHONE ENCOUNTER
Caller: Vivek Gallito    Relationship: Self    Best call back number: [phone]    What was the call regarding: PATIENT CALLED STATING THAT WE NEED TO SEND A REQUEST FOR RECORDS TO GET A COPY OF HIS MRI. PLEASE SEND REQUEST TO FAX: [phone]. [facility].     Do you require a callback: YES IF ANY QUESTIONS

## 2022-04-08 NOTE — TELEPHONE ENCOUNTER
Records not received as of right now. Request sent again. Will check fax on Monday, if not available, will call [facility] again.

## 2022-04-14 NOTE — TELEPHONE ENCOUNTER
Spoke to [facility] & they have not received request yet. Multiple attempts to fax request were done. So since their  Recommended that patient be seen by Dr. Zhang or order additional testing, they will be finally faxing that MRI.

## 2022-04-18 ENCOUNTER — OFFICE VISIT (OUTPATIENT)
Dept: INTERNAL MEDICINE | Facility: CLINIC | Age: 32
End: 2022-04-18

## 2022-04-18 ENCOUNTER — HOSPITAL ENCOUNTER (OUTPATIENT)
Dept: GENERAL RADIOLOGY | Facility: HOSPITAL | Age: 32
Discharge: HOME | End: 2022-04-18

## 2022-04-18 VITALS
RESPIRATION RATE: 16 BRPM | SYSTOLIC BLOOD PRESSURE: 133 MMHG | WEIGHT: 300.4 LBS | HEART RATE: 76 BPM | BODY MASS INDEX: 39.81 KG/M2 | TEMPERATURE: 96.8 F | DIASTOLIC BLOOD PRESSURE: 90 MMHG | HEIGHT: 73 IN | OXYGEN SATURATION: 98 %

## 2022-04-18 DIAGNOSIS — M54.6 ACUTE MIDLINE THORACIC BACK PAIN: Primary | ICD-10-CM

## 2022-04-18 DIAGNOSIS — M54.6 ACUTE MIDLINE THORACIC BACK PAIN: ICD-10-CM

## 2022-04-18 PROCEDURE — 72070 X-RAY EXAM THORAC SPINE 2VWS: CPT

## 2022-04-18 PROCEDURE — 99214 OFFICE O/P EST MOD 30 MIN: CPT | Performed by: INTERNAL MEDICINE

## 2022-04-18 RX ORDER — METHYLPREDNISOLONE 4 MG/1
TABLET ORAL
COMMUNITY
Start: 2022-03-29 | End: 2022-10-26

## 2022-04-18 RX ORDER — MELOXICAM 15 MG/1
15 TABLET ORAL DAILY
Qty: 30 TABLET | Refills: 1 | Status: SHIPPED | OUTPATIENT
Start: 2022-04-18 | End: 2022-10-26

## 2022-04-18 RX ORDER — METHOCARBAMOL 750 MG/1
2250 TABLET, FILM COATED ORAL DAILY
COMMUNITY
Start: 2022-04-14 | End: 2022-10-26

## 2022-04-18 RX ORDER — HYDROCODONE BITARTRATE AND ACETAMINOPHEN 5; 325 MG/1; MG/1
TABLET ORAL
COMMUNITY
Start: 2022-04-12 | End: 2022-10-26
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
## 2022-05-23 ENCOUNTER — OFFICE VISIT (OUTPATIENT)
Dept: INTERNAL MEDICINE | Facility: CLINIC | Age: 32
End: 2022-05-23

## 2022-05-23 VITALS
RESPIRATION RATE: 16 BRPM | HEIGHT: 73 IN | BODY MASS INDEX: 39.73 KG/M2 | WEIGHT: 299.8 LBS | TEMPERATURE: 96.3 F | OXYGEN SATURATION: 98 % | DIASTOLIC BLOOD PRESSURE: 90 MMHG | SYSTOLIC BLOOD PRESSURE: 139 MMHG | HEART RATE: 73 BPM

## 2022-05-23 DIAGNOSIS — G89.29 CHRONIC MIDLINE THORACIC BACK PAIN: Primary | ICD-10-CM

## 2022-05-23 DIAGNOSIS — M54.6 CHRONIC MIDLINE THORACIC BACK PAIN: Primary | ICD-10-CM

## 2022-05-23 PROCEDURE — 99214 OFFICE O/P EST MOD 30 MIN: CPT | Performed by: INTERNAL MEDICINE
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
## 2022-05-24 ENCOUNTER — TELEPHONE (OUTPATIENT)
Dept: INTERNAL MEDICINE | Facility: CLINIC | Age: 32
End: 2022-05-24

## 2022-05-24 NOTE — TELEPHONE ENCOUNTER
We are referring to Dr. Whittington so we will let him decide if he needs an MRI of the T-spine.  We will not plan to do the [id number].

## 2022-05-24 NOTE — TELEPHONE ENCOUNTER
THE PATIENT STATES THAT HIS INSURANCE TOLD HIM THAT HE WILL NEED A CLINICAL PRIOR-AUTHORIZATION BEFORE RECEIVING HIS MRI. PLEASE ADVISE.

## 2022-05-24 NOTE — TELEPHONE ENCOUNTER
MRI TSpine ([facility] [id number]) needs peer to peer. Please advise    [phone]  Case# [id number]  ID: [id number]

## 2022-06-07 ENCOUNTER — HOSPITAL ENCOUNTER (OUTPATIENT)
Dept: PHYSICAL THERAPY | Facility: HOSPITAL | Age: 32
Setting detail: THERAPIES SERIES
Discharge: HOME | End: 2022-06-07

## 2022-06-07 DIAGNOSIS — M54.6 ACUTE MIDLINE THORACIC BACK PAIN: Primary | ICD-10-CM

## 2022-06-07 DIAGNOSIS — R29.898 WEAKNESS OF BOTH UPPER EXTREMITIES: ICD-10-CM

## 2022-06-07 DIAGNOSIS — M25.60 DECREASED RANGE OF MOTION: ICD-10-CM

## 2022-06-07 PROCEDURE — 97110 THERAPEUTIC EXERCISES: CPT

## 2022-06-07 PROCEDURE — 97161 PT EVAL LOW COMPLEX 20 MIN: CPT
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
## 2022-06-09 ENCOUNTER — HOSPITAL ENCOUNTER (OUTPATIENT)
Dept: PHYSICAL THERAPY | Facility: HOSPITAL | Age: 32
Setting detail: THERAPIES SERIES
Discharge: HOME | End: 2022-06-09

## 2022-06-09 DIAGNOSIS — R29.898 WEAKNESS OF BOTH UPPER EXTREMITIES: ICD-10-CM

## 2022-06-09 DIAGNOSIS — M25.60 DECREASED RANGE OF MOTION: ICD-10-CM

## 2022-06-09 DIAGNOSIS — M54.6 ACUTE MIDLINE THORACIC BACK PAIN: Primary | ICD-10-CM

## 2022-06-09 PROCEDURE — 97140 MANUAL THERAPY 1/> REGIONS: CPT

## 2022-06-09 PROCEDURE — 97110 THERAPEUTIC EXERCISES: CPT

## 2022-06-09 NOTE — THERAPY TREATMENT NOTE
Outpatient Physical Therapy Ortho Treatment Note  [facility]     Patient Name: Gallito Doyle  : 1990  MRN: [id number]  Today's Date: 2022      Visit Date: 2022    Visit Dx:    ICD-10-CM ICD-9-CM   1. Acute midline thoracic back pain  M54.6 724.1   2. Decreased range of motion  M25.60 719.50   3. Weakness of both upper extremities  R29.898 729.89       There is no problem list on file for this patient.       No past medical history on file.     No past surgical history on file.                     PT Assessment/Plan     Row Name 22 1600          PT Assessment    Assessment Comments Mr. Doyle returns to PT with reports of mild soreness following last session and HEP compliance. Due to time constraints, focused primarily on thoracic mobility. Initiated STM to R UT/LS/paraspinals and thoracic mobilizations. Patient presents with noted hypomobility of T6-9 spinal segments that receives mild improvement during P/A mobilizations. Added UBE as warm up, seated thoracic extension, LS stretch, supine chin tuck and SL thoracic rotations. Due to positive response, updated HEP accordingly, reviewed changes with patient and provided updated print out. Mr. Doyle would benefit from continued skilled PT.  -GARRET            PT Plan    PT Plan Comments Response to last session, consider B shoulder ER, row/ext, gentle push/pull at CC, review lifting?  -GARRET           User Key  (r) = Recorded By, (t) = Taken By, (c) = Cosigned By    Initials Name Provider Type    Libby Kyle, PT Physical Therapist                   OP Exercises     Row Name 22 1500             Subjective Comments    Subjective Comments I am a little sore after last session. HEP is going well and I am sorry for my late arrival  -GARRET              Subjective Pain    Able to rate subjective pain? yes  -GARRET      Pre-Treatment Pain Level 4  -GARRET      Subjective Pain Comment arrives 16 minutes late  -GARRET              Total  Minutes    68885 - PT Therapeutic Exercise Minutes 21  -GARRET      91421 - PT Manual Therapy Minutes 10  -GARRET              Exercise 1    Exercise Name 1 UBE  -GARRET      Time 1 4 mins  -GARRET              Exercise 2    Exercise Name 2 james shoulder flexion in HL over towel roll  -GARRET      Cueing 2 Verbal;Tactile  -GARRET      Reps 2 10  -GARRET      Time 2 3 sec  -GARRET              Exercise 3    Exercise Name 3 alternating unilateral shoulder flexion  in HL over towel roll  -GARRET      Cueing 3 Verbal;Tactile  -GARRET      Reps 3 10  -GARRET      Time 3 3 sec  -GARRET              Exercise 6    Exercise Name 6 high door stretch  -GARRET      Cueing 6 Verbal;Demo  -GARRET      Reps 6 3  -GARRET      Time 6 20s  -GARRET              Exercise 7    Exercise Name 7 seated thoracic extension  -GARRET      Cueing 7 Verbal;Demo  -GARRET      Sets 7 1  -GARRET      Reps 7 10  -GARRET      Time 7 5s  -GARRET      Additional Comments towel roll  -GARRET              Exercise 8    Exercise Name 8 SL thoracic rotation  -GARRET      Cueing 8 Verbal;Demo  -GARRET      Sets 8 1  -GARRET      Reps 8 10e  -GARRET              Exercise 9    Exercise Name 9 supine chin tuck  -GARRTE      Cueing 9 Verbal;Demo  -GARRET      Sets 9 1  -GARRET      Reps 9 10  -GARRET      Time 9 5s  -GARRET              Exercise 10    Exercise Name 10 LS stretch  -GARRET      Cueing 10 Verbal;Demo  -GARRET      Reps 10 3  -GARRET      Time 10 20s  -GARRET      Additional Comments R only  -GARRET            User Key  (r) = Recorded By, (t) = Taken By, (c) = Cosigned By    Initials Name Provider Type    Libby Kyle, PT Physical Therapist                         Manual Rx (last 36 hours)     Manual Treatments     Row Name 06/09/22 1500             Total Minutes    58641 - PT Manual Therapy Minutes 10  -GARRET              Manual Rx 1    Manual Rx 1 Location STM R UT/LS  -GARRET              Manual Rx 2    Manual Rx 2 Location prone central/uni P/A to upper/mid thoracic spine  -GARRET      Manual Rx 2 Grade Grade III-IV  -GARRET              Manual Rx 3    Manual Rx 3 Location STM B  paraspinals  -GARRET            User Key  (r) = Recorded By, (t) = Taken By, (c) = Cosigned By    Initials Name Provider Type    Libby Kyle, PT Physical Therapist                 PT OP Goals     Row Name 06/09/22 1600          PT Short Term Goals    STG Date to Achieve 07/07/22  -GARRET     STG 1 Pt will be independent with initial HEP.  -GARRET     STG 1 Progress Ongoing  -GARRET     STG 2 Pt will demonstrate good posture in sitting and standing to reduce strain on thoracic spine.  -GARRET     STG 2 Progress Ongoing  -GARRET     STG 3 Pt will report decrease in pain from continuous to intermittent.  -GARRET     STG 3 Progress Ongoing  -GARRET            Long Term Goals    LTG Date to Achieve 08/06/22  -GARRET     LTG 1 Pt will be independent with advanced HEP for spinal stabilization and core strength.  -GARRET     LTG 1 Progress Ongoing  -GARRET     LTG 2 Pt will demonstrate correct body mechanics with bending/lifting/reaching involved in ADLs and job to reduce strain/irritation of sympotsm.  -GARRET     LTG 2 Progress Ongoing  -GARRET     LTG 3 Pt will report decrease in evening thoracic pain to 3/10 or less.  -GARRET     LTG 3 Progress Ongoing  -GARRET     LTG 4 Pt will socre 30% or less on GRACIELA indicating decrease in perceived functional disability.  -GARRET     LTG 4 Progress Ongoing  -GARRET     LTG 5 Pt will demosntrate increased periscapular muscle strength to 4-/5 or better.  -GARRET     LTG 5 Progress Ongoing  -GARRET           User Key  (r) = Recorded By, (t) = Taken By, (c) = Cosigned By    Initials Name Provider Type    Libby Kyle PT Physical Therapist                Therapy Education  Education Details: updated HEP  Given: HEP, Symptoms/condition management  Program: Progressed  How Provided: Verbal, Demonstration, Written  Provided to: Patient  Level of Understanding: Verbalized, Demonstrated              Time Calculation:   Start Time: 1546  Stop Time: 1617  Time Calculation (min): 31 min  Timed Charges  86039 - PT Therapeutic Exercise  Minutes: 21  04708 - PT Manual Therapy Minutes: 10  Total Minutes  Timed Charges Total Minutes: 31   Total Minutes: 31  Therapy Charges for Today     Code Description Service Date Service Provider Modifiers Qty    96079397523  PT THER PROC EA 15 MIN 6/9/2022 Libby Vicente, PT GP 1    [id number]  PT MANUAL THERAPY EA 15 MIN 6/9/2022 Libby Vicente, PT GP 1                    Libby Vicente, PT  6/9/2022

## 2022-06-16 ENCOUNTER — HOSPITAL ENCOUNTER (OUTPATIENT)
Dept: PHYSICAL THERAPY | Facility: HOSPITAL | Age: 32
Setting detail: THERAPIES SERIES
Discharge: HOME | End: 2022-06-16

## 2022-06-16 DIAGNOSIS — M25.60 DECREASED RANGE OF MOTION: ICD-10-CM

## 2022-06-16 DIAGNOSIS — M54.6 ACUTE MIDLINE THORACIC BACK PAIN: Primary | ICD-10-CM

## 2022-06-16 DIAGNOSIS — R29.898 WEAKNESS OF BOTH UPPER EXTREMITIES: ICD-10-CM

## 2022-06-16 PROCEDURE — 97140 MANUAL THERAPY 1/> REGIONS: CPT

## 2022-06-16 PROCEDURE — 97110 THERAPEUTIC EXERCISES: CPT

## 2022-06-16 NOTE — THERAPY TREATMENT NOTE
Outpatient Physical Therapy Ortho Treatment Note  [facility]     Patient Name: Gallito Doyle  : 1990  MRN: [id number]  Today's Date: 2022      Visit Date: 2022    Visit Dx:    ICD-10-CM ICD-9-CM   1. Acute midline thoracic back pain  M54.6 724.1   2. Decreased range of motion  M25.60 719.50   3. Weakness of both upper extremities  R29.898 729.89       There is no problem list on file for this patient.       No past medical history on file.     No past surgical history on file.                     PT Assessment/Plan     Row Name 22 1600          PT Assessment    Assessment Comments Quintin returns to PT with overall reduction in thoracic pain/stiffness following last session and feels its directly related to HEP interventions. Patient continues to express primary compliant involves sitting > 15 minutes without back support. Encouraged patient to monitor postural awareness utilizing alarms and to increased scapular engagement to increased duration prior to onset of pain. Patient continues to present with trigger point within R UT/LS muscular distrubution that receives minimal relief with STM. Reviewed thoracic mobility interventions and added B shoulder ER and shoulder row/extension with resistance. Patient requires cues with dorsal scapular strengthening exercises to reduce UT/LS over activation. Mr. Doyle remains a candidate for skilled PT.  -GARRET            PT Plan    PT Plan Comments STM response, DN in future? push/pull, lifting  -GARRET           User Key  (r) = Recorded By, (t) = Taken By, (c) = Cosigned By    Initials Name Provider Type    Libby Kyle, PT Physical Therapist                   OP Exercises     Row Name 22 1600             Subjective Comments    Subjective Comments I am having less back pain after last time and feel like a am less stiff/tight  -GARRET              Subjective Pain    Able to rate subjective pain? yes  -GARRET      Pre-Treatment Pain  Level 4  -GARRET              Total Minutes    22034 - PT Therapeutic Exercise Minutes 29  -GARRET      19991 - PT Manual Therapy Minutes 15  -GARRET              Exercise 1    Exercise Name 1 UBE  -GARRET      Time 1 4 mins  -GARRET              Exercise 2    Exercise Name 2 james shoulder flexion in HL over towel roll  -GARRET      Additional Comments encouraged for HEP  -GARRET              Exercise 3    Exercise Name 3 alternating unilateral shoulder flexion  in HL over towel roll  -GARRET      Additional Comments encouraged for HEP  -GARRET              Exercise 4    Exercise Name 4 shoulder Horizontal Abd  -GARRET      Cueing 4 Verbal;Tactile;Demo  -GARRET      Sets 4 2  -GARRET      Reps 4 10  -GARRET      Time 4 3 sec  -GARRET      Additional Comments GTB, large blue noodle  -GARRET              Exercise 5    Exercise Name 5 shoulder row/ext  -GARRET      Cueing 5 Verbal;Demo  -GARRET      Sets 5 2  -GARRET      Reps 5 10e  -GARRET      Time 5 GTB  -GARRET              Exercise 6    Exercise Name 6 high door stretch  -GARRET      Cueing 6 Verbal;Demo  -GARRET      Reps 6 3  -GARRET      Time 6 20s  -GARRET              Exercise 7    Exercise Name 7 seated thoracic extension  -GARRET      Cueing 7 Verbal;Demo  -GARRET      Sets 7 1  -GARRET      Reps 7 15  -GARRET      Time 7 5s  -GARRET      Additional Comments black foam roll  -GARRET              Exercise 8    Exercise Name 8 SL thoracic rotation  -GARRET      Cueing 8 Verbal;Demo  -GARRET      Sets 8 1  -GARRET      Reps 8 15e  -GARRET      Additional Comments cues to increase hip flexion to isolate thoracic spine  -GARRET              Exercise 9    Exercise Name 9 supine chin tuck  -GARRET      Cueing 9 Verbal;Demo  -GARRET      Sets 9 1  -GARRET      Reps 9 10  -GARRET      Time 9 5s  -GARRET              Exercise 10    Exercise Name 10 LS stretch  -GARRET      Cueing 10 Verbal;Demo  -GARRET      Reps 10 3b  -GARRET      Time 10 20s  -GARRET              Exercise 11    Exercise Name 11 B shoulder ER  -GARRET      Cueing 11 Verbal;Demo  -GARRET      Sets 11 2  -GARRET      Reps 11 10  -GARRET      Additional Comments GTB, large blue noodle  -GARRET             User Key  (r) = Recorded By, (t) = Taken By, (c) = Cosigned By    Initials Name Provider Type    Libby Kyle, PT Physical Therapist                         Manual Rx (last 36 hours)     Manual Treatments     Row Name 06/16/22 1600             Total Minutes    84458 - PT Manual Therapy Minutes 15  -GARRET              Manual Rx 1    Manual Rx 1 Location STM R UT/LS  -GARRET              Manual Rx 2    Manual Rx 2 Location prone central/uni P/A to upper/mid thoracic spine  -GARRET      Manual Rx 2 Grade Grade III-IV  -GARRET              Manual Rx 3    Manual Rx 3 Location STM B paraspinals  -GARRET            User Key  (r) = Recorded By, (t) = Taken By, (c) = Cosigned By    Initials Name Provider Type    Libby Kyle, PT Physical Therapist                 PT OP Goals     Row Name 06/16/22 1500          PT Short Term Goals    STG Date to Achieve 07/07/22  -GARRET     STG 1 Pt will be independent with initial HEP.  -GARRET     STG 1 Progress Ongoing  -GARRET     STG 2 Pt will demonstrate good posture in sitting and standing to reduce strain on thoracic spine.  -GARRET     STG 2 Progress Ongoing  -GARRET     STG 3 Pt will report decrease in pain from continuous to intermittent.  -     STG 3 Progress Ongoing  -GARRET            Long Term Goals    LTG Date to Achieve 08/06/22  -GARRET     LTG 1 Pt will be independent with advanced HEP for spinal stabilization and core strength.  -GARRET     LTG 1 Progress Ongoing  -GARRET     LTG 2 Pt will demonstrate correct body mechanics with bending/lifting/reaching involved in ADLs and job to reduce strain/irritation of sympotsm.  -GARRET     LTG 2 Progress Ongoing  -GARRET     LTG 3 Pt will report decrease in evening thoracic pain to 3/10 or less.  -GARRET     LTG 3 Progress Ongoing  -GARRET     LTG 4 Pt will socre 30% or less on GRACIELA indicating decrease in perceived functional disability.  -GARRET     LTG 4 Progress Ongoing  -GARRET     LTG 5 Pt will demosntrate increased periscapular muscle strength to 4-/5 or better.   -GARRET     LTG 5 Progress Ongoing  -GARRET           User Key  (r) = Recorded By, (t) = Taken By, (c) = Cosigned By    Initials Name Provider Type    Libby Kyle, PT Physical Therapist                Therapy Education  Given: HEP, Symptoms/condition management  Program: Progressed  How Provided: Verbal, Demonstration, Written  Provided to: Patient  Level of Understanding: Verbalized, Demonstrated              Time Calculation:   Start Time: 1600  Stop Time: 1644  Time Calculation (min): 44 min  Timed Charges  39389 - PT Therapeutic Exercise Minutes: 29  78101 - PT Manual Therapy Minutes: 15  Total Minutes  Timed Charges Total Minutes: 44   Total Minutes: 44  Therapy Charges for Today     Code Description Service Date Service Provider Modifiers Qty    17339360660 HC PT THER PROC EA 15 MIN 6/16/2022 Libby Vicente, PT GP 2    [id number] HC PT MANUAL THERAPY EA 15 MIN 6/16/2022 Libby Vicente, PT GP 1                    Libby Vicente PT  6/16/2022

## 2022-06-21 ENCOUNTER — HOSPITAL ENCOUNTER (OUTPATIENT)
Dept: ULTRASOUND IMAGING | Facility: HOSPITAL | Age: 32
Discharge: HOME | End: 2022-06-21
Admitting: INTERNAL MEDICINE

## 2022-06-21 DIAGNOSIS — M54.6 CHRONIC MIDLINE THORACIC BACK PAIN: ICD-10-CM

## 2022-06-21 DIAGNOSIS — G89.29 CHRONIC MIDLINE THORACIC BACK PAIN: ICD-10-CM

## 2022-06-21 PROCEDURE — 76775 US EXAM ABDO BACK WALL LIM: CPT

## 2022-06-23 ENCOUNTER — HOSPITAL ENCOUNTER (OUTPATIENT)
Dept: PHYSICAL THERAPY | Facility: HOSPITAL | Age: 32
Setting detail: THERAPIES SERIES
Discharge: HOME | End: 2022-06-23

## 2022-06-23 DIAGNOSIS — R29.898 WEAKNESS OF BOTH UPPER EXTREMITIES: ICD-10-CM

## 2022-06-23 DIAGNOSIS — M25.60 DECREASED RANGE OF MOTION: ICD-10-CM

## 2022-06-23 DIAGNOSIS — M54.6 ACUTE MIDLINE THORACIC BACK PAIN: Primary | ICD-10-CM

## 2022-06-23 PROCEDURE — 97140 MANUAL THERAPY 1/> REGIONS: CPT

## 2022-06-23 PROCEDURE — 97110 THERAPEUTIC EXERCISES: CPT

## 2022-06-23 NOTE — THERAPY TREATMENT NOTE
Outpatient Physical Therapy Ortho Treatment Note  [facility]     Patient Name: Gallito Doyle  : 1990  MRN: [id number]  Today's Date: 2022      Visit Date: 2022    Visit Dx:    ICD-10-CM ICD-9-CM   1. Acute midline thoracic back pain  M54.6 724.1   2. Decreased range of motion  M25.60 719.50   3. Weakness of both upper extremities  R29.898 729.89       There is no problem list on file for this patient.       No past medical history on file.     No past surgical history on file.                     PT Assessment/Plan     Row Name 22 1600          PT Assessment    Assessment Comments Mr. Doyle returns to PT with reports of mild soreness following last session that receives benefit from performing HEP prior to sleeping. Patient then reports he woke up this AM with increased pain isolated over T8-10 spinal segments that did not improve with scapular retraction and postural awareness. Patient presents with increased R sided paraspinal muscular tension, over painful segments. Initiated STM to paraspinal and applied KT tape at 50% tension in attempt to reduce over activation of spinal muscles. Following application of KT tape, patient expresses relief of mid/lower thoracic spine pain. Reviewed previously completed dorsal scapular strengthening interventions with addition of shoulder diagonals and push up + at wall with good tolerance. Mr. Doyle remains an excellent candidate for skilled PT.  -GARRET            PT Plan    PT Plan Comments STM/KT tape response, DN in future? push/pull, lifting  -GARRET           User Key  (r) = Recorded By, (t) = Taken By, (c) = Cosigned By    Initials Name Provider Type    Libby Kyle, PT Physical Therapist                   OP Exercises     Row Name 22 1600             Subjective Comments    Subjective Comments I am more sore today than I was last time. I felt mild soreness after last session but then I do my HEP and I feel better. I  have most of my soreness first thing in AM  -GARRET              Subjective Pain    Able to rate subjective pain? yes  -GARRET      Pre-Treatment Pain Level 5  -GARRET      Post-Treatment Pain Level 0  -GARRET              Total Minutes    56967 - PT Therapeutic Exercise Minutes 23  -GARRET      82564 - PT Manual Therapy Minutes 20  -GARRET              Exercise 1    Exercise Name 1 UBE  -GARRET      Time 1 4 mins  -GARRET              Exercise 4    Exercise Name 4 shoulder Horizontal Abd  -GARRET      Cueing 4 Verbal;Tactile;Demo  -GARRET      Sets 4 2  -GARRET      Reps 4 10  -GARRET      Time 4 3 sec  -GARRET      Additional Comments GTB, large blue noodle  -GARRET              Exercise 5    Exercise Name 5 shoulder row/ext  -GARRET      Cueing 5 Verbal;Demo  -GARRET      Sets 5 2  -GARRET      Reps 5 10e  -GARRET      Time 5 GTB  -GARRET              Exercise 6    Exercise Name 6 high door stretch  -GARRET      Cueing 6 Verbal;Demo  -GARRET      Reps 6 3  -GARRET      Time 6 20s  -GARRET              Exercise 8    Exercise Name 8 SL thoracic rotation  -GARRET      Cueing 8 Verbal;Demo  -GARRET      Sets 8 1  -GARRET      Reps 8 15e  -GARRET              Exercise 9    Exercise Name 9 supine chin tuck  -GARRET      Cueing 9 Verbal;Demo  -GARRET      Sets 9 1  -GARRET      Reps 9 10  -GARRET      Time 9 5s  -GARRET              Exercise 11    Exercise Name 11 B shoulder ER  -GARRET      Cueing 11 Verbal;Demo  -GARRET      Sets 11 2  -GARRET      Reps 11 10  -GARRET      Additional Comments GTB, large blue noodle  -GARRET              Exercise 12    Exercise Name 12 push up +  -GARRET      Cueing 12 Verbal;Demo  -GARRET      Reps 12 15  -GARRET      Additional Comments cues for scapular protraction  -GARRET            User Key  (r) = Recorded By, (t) = Taken By, (c) = Cosigned By    Initials Name Provider Type    Libby Kyle, PT Physical Therapist                         Manual Rx (last 36 hours)     Manual Treatments     Row Name 06/23/22 1600             Total Minutes    02167 - PT Manual Therapy Minutes 20  -GARRET              Manual Rx 1    Manual Rx 1 Location  STM R UT/LS  -GARRET              Manual Rx 2    Manual Rx 2 Location prone central/uni P/A to upper/mid thoracic spine  -GARRET      Manual Rx 2 Grade Grade III-IV  -GARRET              Manual Rx 3    Manual Rx 3 Location STM B paraspinals  -              Manual Rx 4    Manual Rx 4 Location KT technique placing paraspinals on slack from T5-L1 at 50% tension  -GARRET            User Key  (r) = Recorded By, (t) = Taken By, (c) = Cosigned By    Initials Name Provider Type    Libby Kyle PT Physical Therapist                 PT OP Goals     Row Name 06/23/22 1600          PT Short Term Goals    STG Date to Achieve 07/07/22  -GARRET     STG 1 Pt will be independent with initial HEP.  -GARRET     STG 1 Progress Ongoing  -GARRET     STG 2 Pt will demonstrate good posture in sitting and standing to reduce strain on thoracic spine.  -GARRET     STG 2 Progress Ongoing  -GARRET     STG 3 Pt will report decrease in pain from continuous to intermittent.  -GARRET     STG 3 Progress Ongoing  -GARRET            Long Term Goals    LTG Date to Achieve 08/06/22  -GARRET     LTG 1 Pt will be independent with advanced HEP for spinal stabilization and core strength.  -GARRET     LTG 1 Progress Ongoing  -GARRET     LTG 2 Pt will demonstrate correct body mechanics with bending/lifting/reaching involved in ADLs and job to reduce strain/irritation of sympotsm.  -GARRET     LTG 2 Progress Ongoing  -GARRET     LTG 3 Pt will report decrease in evening thoracic pain to 3/10 or less.  -GARRET     LTG 3 Progress Ongoing  -GARRET     LTG 4 Pt will socre 30% or less on GRACIELA indicating decrease in perceived functional disability.  -     LTG 4 Progress Ongoing  -GARRET     LTG 5 Pt will demosntrate increased periscapular muscle strength to 4-/5 or better.  -     LTG 5 Progress Ongoing  -GARRET           User Key  (r) = Recorded By, (t) = Taken By, (c) = Cosigned By    Initials Name Provider Type    Libby Kyle, MADY Physical Therapist                               Time Calculation:   Start Time:  1615  Stop Time: 1658  Time Calculation (min): 43 min  Timed Charges  02539 - PT Therapeutic Exercise Minutes: 23  85144 - PT Manual Therapy Minutes: 20  Total Minutes  Timed Charges Total Minutes: 43   Total Minutes: 43  Therapy Charges for Today     Code Description Service Date Service Provider Modifiers Qty    [id number]  PT THER PROC EA 15 MIN 6/23/2022 Libby Vicente, PT GP 2    [id number]  PT MANUAL THERAPY EA 15 MIN 6/23/2022 Libby Vicente, PT GP 1                    Libby Vicente, PT  6/23/2022

## 2022-06-28 ENCOUNTER — APPOINTMENT (OUTPATIENT)
Dept: PHYSICAL THERAPY | Facility: HOSPITAL | Age: 32
End: 2022-06-28

## 2022-07-05 ENCOUNTER — HOSPITAL ENCOUNTER (OUTPATIENT)
Dept: PHYSICAL THERAPY | Facility: HOSPITAL | Age: 32
Setting detail: THERAPIES SERIES
Discharge: HOME | End: 2022-07-05

## 2022-07-05 DIAGNOSIS — R29.898 WEAKNESS OF BOTH UPPER EXTREMITIES: ICD-10-CM

## 2022-07-05 DIAGNOSIS — M25.60 DECREASED RANGE OF MOTION: ICD-10-CM

## 2022-07-05 DIAGNOSIS — M54.6 ACUTE MIDLINE THORACIC BACK PAIN: Primary | ICD-10-CM

## 2022-07-05 PROCEDURE — 97110 THERAPEUTIC EXERCISES: CPT

## 2022-07-05 PROCEDURE — 97140 MANUAL THERAPY 1/> REGIONS: CPT

## 2022-07-05 NOTE — THERAPY PROGRESS REPORT/RE-CERT
Outpatient Physical Therapy Ortho Progress Note  [facility]     Patient Name: Gallito Doyle  : 1990  MRN: [id number]  Today's Date: 2022      Visit Date: 2022    Visit Dx:    ICD-10-CM ICD-9-CM   1. Acute midline thoracic back pain  M54.6 724.1   2. Decreased range of motion  M25.60 719.50   3. Weakness of both upper extremities  R29.898 729.89       There is no problem list on file for this patient.       No past medical history on file.     No past surgical history on file.                     PT Assessment/Plan     Row Name 22 1600          PT Assessment    Functional Limitations Limitation in home management;Limitations in community activities;Limitations in functional capacity and performance;Performance in leisure activities;Performance in self-care ADL;Performance in sport activities  -     Impairments Pain;Posture;Poor body mechanics;Range of motion;Muscle strength  -GARRET     Assessment Comments Gallito Doyle has been seen for 5 physical therapy sessions for acute midline thoracic back pain. Patient returns to PT after 1.5 weeks due to being out of town and reports no episodes of pain while on vacation despite participating in frequent and long walks. Treatment has included therapeutic exercise, manual therapy and patient education with home exercise program . Progress to physical therapy goals is good. Pt has met 3/3 STG and 1/5 LTG and progressing toward all remaining goals. Patient reports overall 70% improvement from initial evaluation with lingering pain/discomfort with push/pull work related activities and when lifting heavy objects. Patient continues to demonstrate hypomobility of mid thoracic spine leading to intermittent pain felt over T8-10 spinal segments, greatest with B trunk rotation. Patients GRACIELA score improved from 40% on initial evaluation to 22%, where 0% indicates no disability. Due to patient experiencing pain with push/pull related tasks,  progressed dorsal scapular strengthening exercises to cable machine and added low to high chest press and thoracic rotation to standing with resistance. He will benefit from continued skilled physical therapy to address remaining impairments and functional limitations.  -GARRET     Please refer to paper survey for additional self-reported information Yes  -GARRET     Rehab Potential Good  -GARRET     Patient/caregiver participated in establishment of treatment plan and goals Yes  -GARRET     Patient would benefit from skilled therapy intervention Yes  -GARRET            PT Plan    PT Frequency 2x/week;1x/week  -GARRET     Predicted Duration of Therapy Intervention (PT) 4-5 visits  -GARRET     Planned CPT's? PT RE-EVAL: 28539;PT THER PROC EA 15 MIN: 19039;PT THER ACT EA 15 MIN: 92452;PT MANUAL THERAPY EA 15 MIN: 34940;PT NEUROMUSC RE-EDUCATION EA 15 MIN: 00329;PT SELF CARE/HOME MGMT/TRAIN EA 15: 68270;PT HOT OR COLD PACK TREAT MCARE  -GARRET     PT Plan Comments tape? how did work go?, consider bear hug? lifting?  -GARRET           User Key  (r) = Recorded By, (t) = Taken By, (c) = Cosigned By    Initials Name Provider Type    Libby Kyle, PT Physical Therapist                   OP Exercises     Row Name 07/05/22 1500             Subjective Comments    Subjective Comments I had no pain while on my trip and I feel I am about 70% better. I still have some pain when rotating my trunk and when doing work related tasks. The tape really seemed to help  -GARRET              Subjective Pain    Able to rate subjective pain? yes  -GARRET      Pre-Treatment Pain Level 4  -GARRET              Total Minutes    02042 - PT Therapeutic Exercise Minutes 35  -GARRET      75915 - PT Manual Therapy Minutes 10  -GARRET              Exercise 1    Exercise Name 1 UBE  -GARRET      Time 1 4 mins  -GARRET              Exercise 2    Exercise Name 2 high to low chest press  -GARRET      Cueing 2 Verbal;Demo  -GARRET      Sets 2 2  -GARRET      Reps 2 10  -GARRET      Time 2 60#  -GARRET      Additional Comments  dowel  -GARRET              Exercise 3    Exercise Name 3 Time spent filling out survey, discussing POC, goals and overall progress and relationship to work related tasks  -GARRET              Exercise 4    Exercise Name 4 shoulder Horizontal Abd  -GARRET      Cueing 4 Verbal;Tactile;Demo  -GARRET      Sets 4 2  -GARRET      Reps 4 10  -GARRET      Time 4 standing  -GARRET      Additional Comments GTB  -GARRET              Exercise 5    Exercise Name 5 shoulder row/ext  -GARRET      Cueing 5 Verbal;Demo  -GARRET      Sets 5 2  -GARRET      Reps 5 10e  -GARRET      Time 5 40#, dowel  -GARRET              Exercise 6    Exercise Name 6 high door stretch  -GARRET      Cueing 6 Verbal;Demo  -GARRET      Reps 6 3  -GARRET      Time 6 20s  -GARRET              Exercise 8    Exercise Name 8 standing thoracic rotation  -GARRET      Cueing 8 Verbal;Demo  -GARRET      Sets 8 1  -GARRET      Reps 8 15e  -GARRET      Time 8 RTB  -GARRET              Exercise 11    Exercise Name 11 B shoulder ER  -GARRET      Cueing 11 Verbal;Demo  -GARRET      Sets 11 2  -GARRET      Reps 11 10  -GARRET      Time 11 standing  -GARRET      Additional Comments GTB  -GARRET              Exercise 12    Exercise Name 12 push up +  -GARRET      Cueing 12 Verbal;Demo  -GARRET      Reps 12 15  -GARRET      Additional Comments cues for scapular protraction  -GARRET            User Key  (r) = Recorded By, (t) = Taken By, (c) = Cosigned By    Initials Name Provider Type    Libby Kyle, PT Physical Therapist                         Manual Rx (last 36 hours)     Manual Treatments     Row Name 07/05/22 1500             Total Minutes    70270 - PT Manual Therapy Minutes 10  -GRARET              Manual Rx 2    Manual Rx 2 Location prone central/uni P/A to upper/mid thoracic spine  -GARRET      Manual Rx 2 Grade Grade III-IV  -GARRET              Manual Rx 3    Manual Rx 3 Location STM B paraspinals  -GARRET            User Key  (r) = Recorded By, (t) = Taken By, (c) = Cosigned By    Initials Name Provider Type    Libby Kyle PT Physical Therapist                 PT OP  Goals     Row Name 07/05/22 1500          PT Short Term Goals    STG Date to Achieve 07/07/22  -GARRET     STG 1 Pt will be independent with initial HEP.  -GARRET     STG 1 Progress Met  -GARRET     STG 2 Pt will demonstrate good posture in sitting and standing to reduce strain on thoracic spine.  -GARRET     STG 2 Progress Met  -GARRET     STG 3 Pt will report decrease in pain from continuous to intermittent.  -GARRET     STG 3 Progress Met  -GARRET            Long Term Goals    LTG Date to Achieve 08/06/22  -GARRET     LTG 1 Pt will be independent with advanced HEP for spinal stabilization and core strength.  -GARRET     LTG 1 Progress Ongoing  -GARRET     LTG 2 Pt will demonstrate correct body mechanics with bending/lifting/reaching involved in ADLs and job to reduce strain/irritation of sympotsm.  -GARRET     LTG 2 Progress Ongoing;Progressing  -GARRET     LTG 3 Pt will report decrease in evening thoracic pain to 3/10 or less.  -GARRET     LTG 3 Progress Ongoing  -GARRET     LTG 3 Progress Comments 5/10  -     LTG 4 Pt will socre 30% or less on GRACIELA indicating decrease in perceived functional disability.  -     LTG 4 Progress Met  -     LTG 4 Progress Comments 22%  -     LTG 5 Pt will demosntrate increased periscapular muscle strength to 4-/5 or better.  -     LTG 5 Progress Ongoing  -           User Key  (r) = Recorded By, (t) = Taken By, (c) = Cosigned By    Initials Name Provider Type    Libby Kyle, PT Physical Therapist                     Outcome Measure Options: Modified Oswestry  Modified Oswestry  Modified Oswestry Score/Comments: 22%      Time Calculation:   Start Time: 1530  Stop Time: 1615  Time Calculation (min): 45 min  Timed Charges  02884 - PT Therapeutic Exercise Minutes: 35  36219 - PT Manual Therapy Minutes: 10  Total Minutes  Timed Charges Total Minutes: 45   Total Minutes: 45  Therapy Charges for Today     Code Description Service Date Service Provider Modifiers Qty    78772498336 HC PT THER PROC EA 15 MIN 7/5/2022  Libby Vicente, PT GP 2    53631240625 HC PT MANUAL THERAPY EA 15 MIN 7/5/2022 Libby Vicente, PT GP 1          PT G-Codes  Outcome Measure Options: Modified Oswestry  Modified Oswestry Score/Comments: 22%         Libby Vicente, PT  7/5/2022

## 2022-07-07 ENCOUNTER — HOSPITAL ENCOUNTER (OUTPATIENT)
Dept: PHYSICAL THERAPY | Facility: HOSPITAL | Age: 32
Setting detail: THERAPIES SERIES
Discharge: HOME | End: 2022-07-07

## 2022-07-07 DIAGNOSIS — M25.60 DECREASED RANGE OF MOTION: ICD-10-CM

## 2022-07-07 DIAGNOSIS — M54.6 ACUTE MIDLINE THORACIC BACK PAIN: Primary | ICD-10-CM

## 2022-07-07 DIAGNOSIS — R29.898 WEAKNESS OF BOTH UPPER EXTREMITIES: ICD-10-CM

## 2022-07-07 PROCEDURE — 97140 MANUAL THERAPY 1/> REGIONS: CPT

## 2022-07-07 PROCEDURE — 97110 THERAPEUTIC EXERCISES: CPT

## 2022-07-07 NOTE — THERAPY TREATMENT NOTE
Outpatient Physical Therapy Ortho Treatment Note  [facility]     Patient Name: Gallito Doyle  : 1990  MRN: [id number]  Today's Date: 2022      Visit Date: 2022    Visit Dx:    ICD-10-CM ICD-9-CM   1. Acute midline thoracic back pain  M54.6 724.1   2. Decreased range of motion  M25.60 719.50   3. Weakness of both upper extremities  R29.898 729.89       There is no problem list on file for this patient.       No past medical history on file.     No past surgical history on file.                     PT Assessment/Plan     Row Name 22 1600          PT Assessment    Assessment Comments Mr. Doyle returns to PT with reports of overall improved toleance while working but continues to experience dull soreness over R mid/lower thoracic spine. Patient states he altered his lifting/squatting mechanics after last session leading to improved tolerance with work related duties. Reapplied KT tape in attempt to reduce paraspinal activation while completing push/pull/lifting tasks while working. Also added high to low row with cues to decrease paraspinal activation and increase dorsal scapular conctraction. Patient benefits from palpating PT paraspinals to better understand specific movement leading to over activation. Mr. Doyle remains a candidate for skilled PT.  -GARRET            PT Plan    PT Plan Comments consider bear hug and initiate lifting in 1-2 visits  -GARRET           User Key  (r) = Recorded By, (t) = Taken By, (c) = Cosigned By    Initials Name Provider Type    Libby Kyle, PT Physical Therapist                   OP Exercises     Row Name 22 1500             Subjective Comments    Subjective Comments I was able to change my lifting mechanics and it has helped  -GARRET              Subjective Pain    Able to rate subjective pain? yes  -GARRET      Pre-Treatment Pain Level 4  -GARRET              Total Minutes    15666 - PT Therapeutic Exercise Minutes 30  -GARRET      86874 - PT  Manual Therapy Minutes 10  -GARRET              Exercise 1    Exercise Name 1 UBE  -GARRET      Time 1 4 mins  -GARRET              Exercise 2    Exercise Name 2 high to low chest press  -GARRET      Cueing 2 Verbal;Demo  -GARRET      Sets 2 2  -GARRET      Reps 2 10  -GARRET      Time 2 60#  -GARRET      Additional Comments dowel  -GARRET              Exercise 3    Exercise Name 3 high to low row  -GARRET      Cueing 3 Verbal;Demo  -GARRET      Sets 3 2  -GARRET      Reps 3 10  -GARRET      Time 3 40#  -GARRET      Additional Comments cues for shoulders down and back but to avoid over guillermo and arching back, had pt palpate PT back to understand activation of paraspinals  -GARRET              Exercise 4    Exercise Name 4 shoulder Horizontal Abd  -GARRET      Cueing 4 Verbal;Tactile;Demo  -GARRET      Sets 4 2  -GARRET      Reps 4 10  -GARRET      Time 4 standing  -GARRET      Additional Comments GTB  -GARRET              Exercise 5    Exercise Name 5 shoulder row/ext  -GARRET      Cueing 5 Verbal;Demo  -GARRET      Sets 5 2  -GARRET      Reps 5 10e  -GARRET      Time 5 40#, dowel  -GARRET              Exercise 6    Exercise Name 6 high door stretch  -GARRET      Cueing 6 Verbal;Demo  -GARRET      Reps 6 3  -GARRET      Time 6 20s  -GARRET              Exercise 8    Exercise Name 8 standing thoracic rotation  -GARRET      Cueing 8 Verbal;Demo  -GARRET      Sets 8 1  -GARRET      Reps 8 15e  -GARRET      Time 8 RTB  -GARRET              Exercise 11    Exercise Name 11 B shoulder ER  -GARRET      Cueing 11 Verbal;Demo  -GARRET      Sets 11 2  -GARRET      Reps 11 10  -GARRET      Time 11 standing  -GARRET      Additional Comments GTB  -GARRET            User Key  (r) = Recorded By, (t) = Taken By, (c) = Cosigned By    Initials Name Provider Type    Libby Kyle, PT Physical Therapist                         Manual Rx (last 36 hours)     Manual Treatments     Row Name 07/07/22 1500             Total Minutes    53642 - PT Manual Therapy Minutes 10  -GARRET              Manual Rx 2    Manual Rx 2 Location prone central/uni P/A to upper/mid thoracic spine  -GARRET       Manual Rx 2 Grade Grade III-IV  -GARRET              Manual Rx 3    Manual Rx 3 Location STM B paraspinals  -              Manual Rx 4    Manual Rx 4 Location KT technique placing paraspinals on slack from T5-L1 at 50% tension  -            User Key  (r) = Recorded By, (t) = Taken By, (c) = Cosigned By    Initials Name Provider Type    Libby Kyle, PT Physical Therapist                 PT OP Goals     Row Name 07/07/22 1500          PT Short Term Goals    STG Date to Achieve 07/07/22  -GARRET     STG 1 Pt will be independent with initial HEP.  -GARRET     STG 1 Progress Met  -GARRET     STG 2 Pt will demonstrate good posture in sitting and standing to reduce strain on thoracic spine.  -     STG 2 Progress Met  -GARRET     STG 3 Pt will report decrease in pain from continuous to intermittent.  -     STG 3 Progress Met  -            Long Term Goals    LTG Date to Achieve 08/06/22  -GARRET     LTG 1 Pt will be independent with advanced HEP for spinal stabilization and core strength.  -     LTG 1 Progress Ongoing  -GARRET     LTG 2 Pt will demonstrate correct body mechanics with bending/lifting/reaching involved in ADLs and job to reduce strain/irritation of sympotsm.  -     LTG 2 Progress Ongoing;Progressing  -GARRET     LTG 3 Pt will report decrease in evening thoracic pain to 3/10 or less.  -     LTG 3 Progress Ongoing  -GARRET     LTG 4 Pt will socre 30% or less on GRACIELA indicating decrease in perceived functional disability.  -     LTG 4 Progress Met  -     LTG 5 Pt will demosntrate increased periscapular muscle strength to 4-/5 or better.  -     LTG 5 Progress Ongoing  -           User Key  (r) = Recorded By, (t) = Taken By, (c) = Cosigned By    Initials Name Provider Type    Libby Kyle, PT Physical Therapist                               Time Calculation:   Start Time: 1535  Stop Time: 1615  Time Calculation (min): 40 min  Timed Charges  01492 - PT Therapeutic Exercise Minutes: 30  59370 - PT  Manual Therapy Minutes: 10  Total Minutes  Timed Charges Total Minutes: 40   Total Minutes: 40  Therapy Charges for Today     Code Description Service Date Service Provider Modifiers Qty    30150319493  PT THER PROC EA 15 MIN 7/7/2022 Libby Vicente, PT GP 2    [id number]  PT MANUAL THERAPY EA 15 MIN 7/7/2022 Libby Vicente, PT GP 1                    Libby Vicente, PT  7/7/2022

## 2022-07-12 ENCOUNTER — HOSPITAL ENCOUNTER (OUTPATIENT)
Dept: PHYSICAL THERAPY | Facility: HOSPITAL | Age: 32
Setting detail: THERAPIES SERIES
Discharge: HOME | End: 2022-07-12

## 2022-07-12 ENCOUNTER — APPOINTMENT (OUTPATIENT)
Dept: ULTRASOUND IMAGING | Facility: HOSPITAL | Age: 32
End: 2022-07-12

## 2022-07-12 ENCOUNTER — APPOINTMENT (OUTPATIENT)
Dept: MRI IMAGING | Facility: HOSPITAL | Age: 32
End: 2022-07-12

## 2022-07-12 DIAGNOSIS — M25.60 DECREASED RANGE OF MOTION: ICD-10-CM

## 2022-07-12 DIAGNOSIS — M54.6 ACUTE MIDLINE THORACIC BACK PAIN: Primary | ICD-10-CM

## 2022-07-12 DIAGNOSIS — R29.898 WEAKNESS OF BOTH UPPER EXTREMITIES: ICD-10-CM

## 2022-07-12 PROCEDURE — 97140 MANUAL THERAPY 1/> REGIONS: CPT

## 2022-07-12 PROCEDURE — 97110 THERAPEUTIC EXERCISES: CPT

## 2022-07-12 NOTE — THERAPY TREATMENT NOTE
Outpatient Physical Therapy Ortho Treatment Note  [facility]     Patient Name: Gallito Doyle  : 1990  MRN: [id number]  Today's Date: 2022      Visit Date: 2022    Visit Dx:    ICD-10-CM ICD-9-CM   1. Acute midline thoracic back pain  M54.6 724.1   2. Decreased range of motion  M25.60 719.50   3. Weakness of both upper extremities  R29.898 729.89       There is no problem list on file for this patient.       No past medical history on file.     No past surgical history on file.                     PT Assessment/Plan     Row Name 22 1500          PT Assessment    Assessment Comments Quintin reports no pain on weekends or when he was out of town however he notes the pain begins with work.  He demonstrates increasing body awareness through improved form with ther ex and ability to make subtle changes in lumbar spine that improve his power position from which to work that causes less irritation of mid back. Worked on improving ability to use opposite stride position to employ opposite side muscles of posterior chain with heavy pushing at work.  He remains good candidate for skilled therapy services.  -LANRE            PT Plan    PT Plan Comments assess if there is pain when at his second job at [employer]-how is he with long hours of standing and with the bending/reaching and check form with those; is he using the opposite stride position some of the time at work?  -LANRE           User Key  (r) = Recorded By, (t) = Taken By, (c) = Cosigned By    Initials Name Provider Type    Cristina Easton, PT Physical Therapist                   OP Exercises     Row Name 22 1500             Subjective Comments    Subjective Comments Over the weekend was good but after going back to work Monday it kind of aggravated it, not bad pain but irritated.  -LANRE              Subjective Pain    Able to rate subjective pain? yes  -LANRE      Pre-Treatment Pain Level 4  -LANRE      Post-Treatment Pain Level 3   -JA              Total Minutes    17331 - PT Therapeutic Exercise Minutes 30  -JA      34656 - PT Manual Therapy Minutes 10  -JA              Exercise 1    Exercise Name 1 UBE  -JA      Time 1 4 mins  -JA              Exercise 2    Exercise Name 2 high to low chest press  -JA      Cueing 2 Verbal;Demo  -JA      Sets 2 2  -JA      Reps 2 10  -JA      Time 2 60#  -JA      Additional Comments bar; cued slight tuck of tailbone to reduce lumbar lordosis  -JA              Exercise 3    Exercise Name 3 high to low row  -JA      Cueing 3 Verbal;Demo  -JA      Sets 3 2 cued slight tuck of tailbone to reduce lumbar lordosis  -JA      Reps 3 10  -JA      Time 3 40#  -JA      Additional Comments improved ability to activate target muscles only  -JA              Exercise 4    Exercise Name 4 shoulder Horizontal Abd  -JA      Cueing 4 Verbal;Tactile;Demo  -JA      Sets 4 2  -JA      Reps 4 10  -JA      Time 4 standing  -JA      Additional Comments GTB; added blue foam roll behind back  -JA              Exercise 5    Exercise Name 5 shoulder row/ext  -JA      Cueing 5 Verbal;Demo  -JA      Sets 5 2  -JA      Reps 5 10e  -JA      Time 5 40#, bar  -JA      Additional Comments cued slight tuck of tailbone to reduce lumbar lordosis  -JA              Exercise 6    Exercise Name 6 high door stretch  -JA      Cueing 6 Verbal;Demo  -JA      Reps 6 3  -JA      Time 6 20s  -JA              Exercise 8    Exercise Name 8 standing thoracic rotation  -JA      Cueing 8 Verbal;Demo  -JA      Sets 8 1  -JA      Reps 8 15e  -JA      Time 8 RTB  -JA      Additional Comments cued slight tuck of tailbone to reduce lumbar lordosis  -JA              Exercise 9    Exercise Name 9 added resisted thoracic rotation from highest point of webslide  -JA      Cueing 9 Verbal;Demo  -JA      Reps 9 10 ea  -JA      Additional Comments GTB; cued slight tuck of tailbone to reduce lumbar lordosis  -JA              Exercise 11    Exercise Name 11 B shoulder ER  -JA    "   Cueing 11 Verbal;Demo  -JA      Sets 11 2  -JA      Reps 11 10  -JA      Time 11 standing  -JA      Additional Comments GTB; added blue foam roller behind back  -JA              Exercise 12    Exercise Name 12 added isometric push from LEs in stride of hands into blue ball against wall in power position  -JA      Cueing 12 Verbal;Tactile;Demo  -JA      Reps 12 2 x 10 ea  -JA      Additional Comments worked on power coming from LE's and not shoulders or back  -JA              Exercise 13    Exercise Name 13 added isometric push of foot into blue ball with force coming from LE on floor  -JA      Cueing 13 Verbal;Tactile;Demo  -JA      Reps 13 10 ea  -JA            User Key  (r) = Recorded By, (t) = Taken By, (c) = Cosigned By    Initials Name Provider Type    Cristina Easton, PT Physical Therapist                         Manual Rx (last 36 hours)     Manual Treatments     Row Name 07/12/22 1500             Total Minutes    96023 - PT Manual Therapy Minutes 10  -JA              Manual Rx 2    Manual Rx 2 Location prone central/uni P/A to upper/mid thoracic spine  -      Manual Rx 2 Grade Grade II-III  -              Manual Rx 3    Manual Rx 3 Location STM B paraspinals  -              Manual Rx 4    Manual Rx 4 Location KT technique placing paraspinals on slack from T5-L1 at 50% tension  -            User Key  (r) = Recorded By, (t) = Taken By, (c) = Cosigned By    Initials Name Provider Type    Cristina Easton, PT Physical Therapist                    Therapy Education  Education Details: Worked on \"Power position\" cued slight tuck of tailbone to reduce lumbar lordosis, tip from hips with knees soft or bent depending on direction of force.  Encouraged him to develop ability to use opposite stance position to distribute the heavy work between R and L LEs to reduce repetitive use strain.  Given: Symptoms/condition management, Pain management, Posture/body mechanics, Mobility training  Program: " Reinforced, Progressed  How Provided: Verbal, Demonstration  Provided to: Patient  Level of Understanding: Verbalized, Demonstrated              Time Calculation:   Timed Charges  42731 - PT Therapeutic Exercise Minutes: 30  40327 - PT Manual Therapy Minutes: 10  Total Minutes  Timed Charges Total Minutes: 40   Total Minutes: 40  Therapy Charges for Today     Code Description Service Date Service Provider Modifiers Qty    61878635754  PT THER PROC EA 15 MIN 7/12/2022 Cristina Madrid, PT GP 2    [id number]  PT MANUAL THERAPY EA 15 MIN 7/12/2022 Cristina Madrid, PT GP 1                    Cristina Madrid, PT  7/12/2022

## 2022-07-14 ENCOUNTER — HOSPITAL ENCOUNTER (OUTPATIENT)
Dept: PHYSICAL THERAPY | Facility: HOSPITAL | Age: 32
Setting detail: THERAPIES SERIES
Discharge: HOME | End: 2022-07-14

## 2022-07-14 DIAGNOSIS — M54.6 ACUTE MIDLINE THORACIC BACK PAIN: Primary | ICD-10-CM

## 2022-07-14 DIAGNOSIS — M25.60 DECREASED RANGE OF MOTION: ICD-10-CM

## 2022-07-14 DIAGNOSIS — R29.898 WEAKNESS OF BOTH UPPER EXTREMITIES: ICD-10-CM

## 2022-07-14 PROCEDURE — 97110 THERAPEUTIC EXERCISES: CPT

## 2022-07-14 PROCEDURE — 97530 THERAPEUTIC ACTIVITIES: CPT

## 2022-07-14 NOTE — THERAPY TREATMENT NOTE
Outpatient Physical Therapy Ortho Treatment Note  [facility]     Patient Name: Gallito Doyle  : 1990  MRN: [id number]  Today's Date: 2022      Visit Date: 2022    Visit Dx:    ICD-10-CM ICD-9-CM   1. Acute midline thoracic back pain  M54.6 724.1   2. Decreased range of motion  M25.60 719.50   3. Weakness of both upper extremities  R29.898 729.89       There is no problem list on file for this patient.       No past medical history on file.     No past surgical history on file.                     PT Assessment/Plan     Row Name 22 1600          PT Assessment    Assessment Comments Quintin returns to PT with reports of overall 75% from PLOF with lingering R medial scapular and lower thoracic discomfort with push/pull related work tasks. Continued focus on postural awareness with dorsal scapular activation and reduction in paraspinal contraction with various movements. Manual therapy not performed this date and added squat/lifting mechanics with carrying crate to mimic work/home related activities. Patient requires extensive cues to improve mechanics and increase scapular retraction and core activation. Discussed various exercises to perform at gym over the weekend. Patient verbalizes understanding and remains a good candidate for skilled PT.  -GARRET            PT Plan    PT Plan Comments how did gym/work go? reduce to 1x/wk? manual PRN, consider OH press  -GARRET           User Key  (r) = Recorded By, (t) = Taken By, (c) = Cosigned By    Initials Name Provider Type    Libby Kyle, PT Physical Therapist                   OP Exercises     Row Name 22 1500             Subjective Comments    Subjective Comments Work is getting better. still have some pain. Gym is going good  -GARRET              Subjective Pain    Able to rate subjective pain? yes  -GARRET      Pre-Treatment Pain Level 2  -GARRET              Total Minutes    18451 - PT Therapeutic Exercise Minutes 28  -GARRET      18154  - PT Therapeutic Activity Minutes 10  -GARRET              Exercise 1    Exercise Name 1 UBE  -GARRET      Time 1 4 mins  -GARRET              Exercise 2    Exercise Name 2 high to low chest press  -GARRET      Cueing 2 Verbal;Demo  -GARRET      Sets 2 2  -GARRET      Reps 2 10  -GARRET      Time 2 60#  -GARRET      Additional Comments bar; cued slight tuck of tailbone to reduce lumbar lordosis  -GARRET              Exercise 3    Exercise Name 3 high to low row  -GARRET      Cueing 3 Verbal;Demo  -GARRET      Sets 3 2 cued slight tuck of tailbone to reduce lumbar lordosis  -GARRET      Reps 3 10  -GARRET      Time 3 50#  -GARRET              Exercise 5    Exercise Name 5 shoulder row/ext  -GARRET      Cueing 5 Verbal;Demo  -GARRET      Sets 5 2  -GARRET      Reps 5 10e  -GARRET      Time 5 50#, bar  -GARRET      Additional Comments cued slight tuck of tailbone to reduce lumbar lordosis  -GARRET              Exercise 7    Exercise Name 7 thoracic matrix  -GARRET      Cueing 7 Verbal;Demo  -GARRET      Sets 7 1  -GARRET      Reps 7 5e  -GARRET      Time 7 high, mid, low  -GARRET              Exercise 8    Exercise Name 8 standing thoracic rotation  -GARRET      Cueing 8 Verbal;Demo  -GARRET      Sets 8 1  -GARRET      Reps 8 15e  -GARRET      Time 8 RTB  -GARRET      Additional Comments cued slight tuck of tailbone to reduce lumbar lordosis  -GARRET              Exercise 10    Exercise Name 10 bear hug  -GARRET      Cueing 10 Verbal;Demo  -GARRET      Sets 10 2  -GARRET      Reps 10 10  -GARRET      Time 10 30#  -GARRET              Exercise 14    Exercise Name 14 squat/lifting mechanics  -GARRET      Cueing 14 Verbal;Demo  -GARRET      Sets 14 3  -GARRET      Reps 14 2  -GARRET      Time 14 20# in orange crate  -GARRET      Additional Comments cues for scapular retraction, squat mechanics, core activation when walking  -GARRET              Exercise 15    Exercise Name 15 suitcase carry with opp arm OH  -GARRET      Cueing 15 Verbal;Demo  -GARRET      Reps 15 1 laps each  -GARRET      Time 15 6# and 10#  -GARRET            User Key  (r) = Recorded By, (t) = Taken By, (c) = Cosigned By    Initials  Name Provider Type    Libby Kyle, PT Physical Therapist                              PT OP Goals     Row Name 07/14/22 1500          PT Short Term Goals    STG Date to Achieve 07/07/22  -GARRET     STG 1 Pt will be independent with initial HEP.  -GARRET     STG 1 Progress Met  -GARRET     STG 2 Pt will demonstrate good posture in sitting and standing to reduce strain on thoracic spine.  -GARRET     STG 2 Progress Met  -GARRET     STG 3 Pt will report decrease in pain from continuous to intermittent.  -GARRET     STG 3 Progress Met  -GARRET            Long Term Goals    LTG Date to Achieve 08/06/22  -GARRET     LTG 1 Pt will be independent with advanced HEP for spinal stabilization and core strength.  -GARRET     LTG 1 Progress Ongoing  -GARRET     LTG 2 Pt will demonstrate correct body mechanics with bending/lifting/reaching involved in ADLs and job to reduce strain/irritation of sympotsm.  -GARRET     LTG 2 Progress Ongoing;Progressing  -GARRET     LTG 3 Pt will report decrease in evening thoracic pain to 3/10 or less.  -GARRET     LTG 3 Progress Ongoing  -GARRET     LTG 4 Pt will socre 30% or less on GRACIELA indicating decrease in perceived functional disability.  -GARRET     LTG 4 Progress Met  -GARRET     LTG 5 Pt will demosntrate increased periscapular muscle strength to 4-/5 or better.  -GARRET     LTG 5 Progress Ongoing  -           User Key  (r) = Recorded By, (t) = Taken By, (c) = Cosigned By    Initials Name Provider Type    Libby Kyle, PT Physical Therapist                               Time Calculation:   Start Time: 1530  Stop Time: 1608  Time Calculation (min): 38 min  Timed Charges  00965 - PT Therapeutic Exercise Minutes: 28  27713 - PT Therapeutic Activity Minutes: 10  Total Minutes  Timed Charges Total Minutes: 38   Total Minutes: 38  Therapy Charges for Today     Code Description Service Date Service Provider Modifiers Qty    81614662993 HC PT THER PROC EA 15 MIN 7/14/2022 Libby Vicente, PT GP 2    [id number]  PT  THERAPEUTIC ACT EA 15 MIN 7/14/2022 Libby Vicente, PT GP 1                    Libby Vicente, PT  7/14/2022

## 2022-07-19 ENCOUNTER — HOSPITAL ENCOUNTER (OUTPATIENT)
Dept: PHYSICAL THERAPY | Facility: HOSPITAL | Age: 32
Setting detail: THERAPIES SERIES
Discharge: HOME | End: 2022-07-19

## 2022-07-19 DIAGNOSIS — M54.6 ACUTE MIDLINE THORACIC BACK PAIN: Primary | ICD-10-CM

## 2022-07-19 DIAGNOSIS — R29.898 WEAKNESS OF BOTH UPPER EXTREMITIES: ICD-10-CM

## 2022-07-19 DIAGNOSIS — M25.60 DECREASED RANGE OF MOTION: ICD-10-CM

## 2022-07-19 PROCEDURE — 97110 THERAPEUTIC EXERCISES: CPT

## 2022-07-19 NOTE — THERAPY TREATMENT NOTE
Outpatient Physical Therapy Ortho Treatment Note  [facility]     Patient Name: Gallito Doyle  : 1990  MRN: [id number]  Today's Date: 2022      Visit Date: 2022    Visit Dx:    ICD-10-CM ICD-9-CM   1. Acute midline thoracic back pain  M54.6 724.1   2. Decreased range of motion  M25.60 719.50   3. Weakness of both upper extremities  R29.898 729.89       There is no problem list on file for this patient.       No past medical history on file.     No past surgical history on file.                     PT Assessment/Plan     Row Name 22 1600          PT Assessment    Assessment Comments Quintin returns to PT with reports of consistent low pain levels and able to reduce/resolve tension/pain with HEP. Continued focus on dorsal scapular/shoulder stabilizers with addition of core stability interventions without increased pain. Intermittent cues required to maintain core activation with OH press and OH lifting to reduce increase lumbar lordosis. Mr. Doyle remains a candidate for HEP.  -GARRET            PT Plan    PT Plan Comments Continue at 2x/wk vs 1x/wk. Consider bent over Y and T  -GARRET           User Key  (r) = Recorded By, (t) = Taken By, (c) = Cosigned By    Initials Name Provider Type    Libby Kyle, PT Physical Therapist                   OP Exercises     Row Name 22 1500             Subjective Comments    Subjective Comments I had a little increased discomfort over the weekend but I think it was my posture while getting my new tattoo. I was able to do my HEP and it helped  -GARRET              Subjective Pain    Able to rate subjective pain? yes  -GARRET      Pre-Treatment Pain Level 2  -GARRET              Total Minutes    54448 - PT Therapeutic Exercise Minutes 39  -GARRET              Exercise 1    Exercise Name 1 UBE  -GARRET      Time 1 4 mins  -GARRET              Exercise 2    Exercise Name 2 high to low chest press  -GARRET      Cueing 2 Verbal;Demo  -GARRET      Sets 2 2  -GARRET      Reps  2 10  -GARRET      Time 2 60#  -GARRET      Additional Comments bar; cued slight tuck of tailbone to reduce lumbar lordosis  -GARRET              Exercise 3    Exercise Name 3 high to low row  -GARRET      Cueing 3 Verbal;Demo  -GARRET      Sets 3 2 cued slight tuck of tailbone to reduce lumbar lordosis  -GARRET      Reps 3 10  -GARRET      Time 3 50#  -GARRET              Exercise 4    Exercise Name 4 unilateral shoulder row with  -GARRET      Cueing 4 Verbal;Demo  -GARRET      Sets 4 2  -GARRET      Reps 4 10  -GARRET      Additional Comments 50#  -GARRET              Exercise 5    Exercise Name 5 shoulder ext  -GARRET      Cueing 5 Verbal;Demo  -GARRET      Sets 5 2  -GARRET      Reps 5 10e  -GARRET      Time 5 50#, bar  -GARRET      Additional Comments cued slight tuck of tailbone to reduce lumbar lordosis  -GARRET              Exercise 7    Exercise Name 7 thoracic matrix  -GARRET      Cueing 7 Verbal;Demo  -GARRET      Sets 7 1  -GARRET      Reps 7 5e  -GARRET      Time 7 high, mid, low  -GARRET              Exercise 9    Exercise Name 9 LTR  -GARRET      Cueing 9 Verbal;Tactile  -GARRET      Sets 9 1  -GARRET      Reps 9 10e  -GARRET      Time 9 5s  -GARRET              Exercise 10    Exercise Name 10 bear hug  -GARRET      Cueing 10 Verbal;Demo  -GARRET      Sets 10 2  -GARRET      Reps 10 10  -GARRET      Time 10 30#  -GARRET              Exercise 13    Exercise Name 13 thoracic rotation with dowel  -GARRET      Cueing 13 Verbal;Demo  -GARRET      Sets 13 1  -GARRET      Reps 13 15  -GARRET      Time 13 40#  -GARRET              Exercise 14    Exercise Name 14 squat/lifting mechanics  -GARRET      Cueing 14 Verbal;Demo  -GARRET      Sets 14 3  -GARRET      Reps 14 2  -GARRET      Time 14 20# in orange crate  -GARRET      Additional Comments cues for scapular retraction, squat mechanics, core activation when walking  -GARRET              Exercise 15    Exercise Name 15 suitcase carry with opp arm OH  -GARRET      Cueing 15 Verbal;Demo  -GARRET      Reps 15 1 laps each  -GARRET      Time 15 6# and 10#  -GARRET      Additional Comments set with OH press  -GARRET              Exercise 16    Exercise Name 16  OH press  -GARRET      Cueing 16 Verbal;Demo  -GARRET      Sets 16 2  -GARRET      Reps 16 10  -GARRET      Time 16 10#  -GARRET      Additional Comments set with carry around clinic  -GARRET            User Key  (r) = Recorded By, (t) = Taken By, (c) = Cosigned By    Initials Name Provider Type    Libby Kyle, PT Physical Therapist                              PT OP Goals     Row Name 07/19/22 1500          PT Short Term Goals    STG Date to Achieve 07/07/22  -GARRET     STG 1 Pt will be independent with initial HEP.  -GARRET     STG 1 Progress Met  -GARRET     STG 2 Pt will demonstrate good posture in sitting and standing to reduce strain on thoracic spine.  -GARRET     STG 2 Progress Met  -GARRET     STG 3 Pt will report decrease in pain from continuous to intermittent.  -GARRET     STG 3 Progress Met  -GARRET            Long Term Goals    LTG Date to Achieve 08/06/22  -GARRET     LTG 1 Pt will be independent with advanced HEP for spinal stabilization and core strength.  -GARRET     LTG 1 Progress Ongoing  -GARRET     LTG 2 Pt will demonstrate correct body mechanics with bending/lifting/reaching involved in ADLs and job to reduce strain/irritation of sympotsm.  -GARRET     LTG 2 Progress Ongoing;Progressing  -GARRET     LTG 3 Pt will report decrease in evening thoracic pain to 3/10 or less.  -GARRET     LTG 3 Progress Ongoing  -GARRET     LTG 4 Pt will socre 30% or less on GRACIELA indicating decrease in perceived functional disability.  -GARRET     LTG 4 Progress Met  -GARRET     LTG 5 Pt will demosntrate increased periscapular muscle strength to 4-/5 or better.  -GARRET     LTG 5 Progress Ongoing  -GARRET           User Key  (r) = Recorded By, (t) = Taken By, (c) = Cosigned By    Initials Name Provider Type    Libby Kyle, PT Physical Therapist                               Time Calculation:   Start Time: 1530  Stop Time: 1609  Time Calculation (min): 39 min  Timed Charges  83491 - PT Therapeutic Exercise Minutes: 39  Total Minutes  Timed Charges Total Minutes: 39   Total Minutes:  39  Therapy Charges for Today     Code Description Service Date Service Provider Modifiers Qty    19080089813  PT THER PROC EA 15 MIN 7/19/2022 Libby Vicente, PT GP 3                    Libby Vicente, PT  7/19/2022

## 2022-07-21 ENCOUNTER — APPOINTMENT (OUTPATIENT)
Dept: PHYSICAL THERAPY | Facility: HOSPITAL | Age: 32
End: 2022-07-21

## 2022-07-26 ENCOUNTER — HOSPITAL ENCOUNTER (OUTPATIENT)
Dept: PHYSICAL THERAPY | Facility: HOSPITAL | Age: 32
Setting detail: THERAPIES SERIES
Discharge: HOME | End: 2022-07-26

## 2022-07-26 DIAGNOSIS — M25.60 DECREASED RANGE OF MOTION: ICD-10-CM

## 2022-07-26 DIAGNOSIS — M54.6 ACUTE MIDLINE THORACIC BACK PAIN: Primary | ICD-10-CM

## 2022-07-26 DIAGNOSIS — R29.898 WEAKNESS OF BOTH UPPER EXTREMITIES: ICD-10-CM

## 2022-07-26 PROCEDURE — 97110 THERAPEUTIC EXERCISES: CPT

## 2022-07-26 NOTE — THERAPY TREATMENT NOTE
Outpatient Physical Therapy Ortho Treatment Note  [facility]     Patient Name: Gallito Doyle  : 1990  MRN: [id number]  Today's Date: 2022      Visit Date: 2022    Visit Dx:    ICD-10-CM ICD-9-CM   1. Acute midline thoracic back pain  M54.6 724.1   2. Decreased range of motion  M25.60 719.50   3. Weakness of both upper extremities  R29.898 729.89       There is no problem list on file for this patient.       No past medical history on file.     No past surgical history on file.                     PT Assessment/Plan     Row Name 22 1600          PT Assessment    Assessment Comments Quintin continues to return to PT with report of low pain level and consistent improvement in activity tolerance with ADLs work related tasks and gym exercises. Patient expresses he is approximately 80% from PLOF and able to return to gym routine with minimal discomfort. Reviewed previously performed interventions with addition of lat pull down. Minimal cues required throughout session. Patient to trial independent management for 3 weeks and anticipate discharge next session.  -GARRET            PT Plan    PT Plan Comments anticipate discharge  -GARRET           User Key  (r) = Recorded By, (t) = Taken By, (c) = Cosigned By    Initials Name Provider Type    Libby Kyle, PT Physical Therapist                   OP Exercises     Row Name 22 1500             Subjective Comments    Subjective Comments My pain is doing really well. Just a dull nag pain.  -GARRET              Subjective Pain    Able to rate subjective pain? yes  -GARRET      Pre-Treatment Pain Level 1  -GARRET              Total Minutes    85663 - PT Therapeutic Exercise Minutes 40  -GARRET              Exercise 1    Exercise Name 1 UBE  -GARRET      Time 1 4 mins  -GARRET              Exercise 2    Exercise Name 2 high to low chest press  -GARRET      Cueing 2 Verbal;Demo  -GARRET      Sets 2 2  -GARRET      Reps 2 10  -GARRET      Time 2 60#  -GARRET      Additional  Comments bar; cued slight tuck of tailbone to reduce lumbar lordosis  -GARRET              Exercise 3    Exercise Name 3 high to low row  -GARRET      Cueing 3 Verbal;Demo  -GARRET      Sets 3 2 cued slight tuck of tailbone to reduce lumbar lordosis  -GARRET      Reps 3 10  -GARRET      Time 3 50#  -GARERT              Exercise 4    Exercise Name 4 unilateral shoulder row with single handle  -GARRET      Cueing 4 Verbal;Demo  -GARRET      Sets 4 2  -GARRET      Reps 4 10  -GARRET      Additional Comments 50#  -GARRET              Exercise 5    Exercise Name 5 shoulder ext  -GARRET      Cueing 5 Verbal;Demo  -GARRTE      Sets 5 2  -GARRET      Reps 5 10e  -GARRET      Time 5 50#, bar  -GARRET      Additional Comments cued slight tuck of tailbone to reduce lumbar lordosis  -GARRET              Exercise 6    Exercise Name 6 seated lat pull down  -GARRET      Cueing 6 Verbal;Demo  -GARRET      Sets 6 2  -GARRET      Reps 6 10  -GARRET      Time 6 50#  -GARRET              Exercise 7    Exercise Name 7 thoracic matrix  -GARRET      Cueing 7 Verbal;Demo  -GARRET      Sets 7 1  -GARRET      Reps 7 5e  -GARRET      Time 7 high, mid, low  -GARRET              Exercise 10    Exercise Name 10 bear hug  -GARRET      Cueing 10 Verbal;Demo  -GARRET      Sets 10 2  -GARRET      Reps 10 10  -GARRET      Time 10 30#  -GARRET              Exercise 13    Exercise Name 13 thoracic rotation with dowel  -GARRET      Cueing 13 Verbal;Demo  -GARRET      Sets 13 2  -GARRET      Reps 13 10  -GARRET      Time 13 50#  -GARRET              Exercise 14    Exercise Name 14 squat/lifting mechanics  -GARRET      Cueing 14 Verbal;Demo  -GARRET      Sets 14 1  -GARRET      Reps 14 5  -GARRET      Time 14 30# in orange crate  -GARRET      Additional Comments cues for scapular retraction, squat mechanics, core activation when walking  -GARRET              Exercise 15    Exercise Name 15 suitcase carry with opp arm OH  -GARRET      Cueing 15 Verbal;Demo  -GARRET      Reps 15 1 laps each  -GARRET      Time 15 6# and 10#  -GARRET      Additional Comments set with OH press  -GARRET              Exercise 16    Exercise Name 16 OH press  -GARRET       Cueing 16 Verbal;Demo  -GARRET      Sets 16 2  -GARRET      Reps 16 10  -GARRET      Time 16 10#  -GARRET      Additional Comments set with carry around clinic  -GARRET            User Key  (r) = Recorded By, (t) = Taken By, (c) = Cosigned By    Initials Name Provider Type    Libby Kyle, PT Physical Therapist                              PT OP Goals     Row Name 07/26/22 1600          PT Short Term Goals    STG Date to Achieve 07/07/22  -GARRET     STG 1 Pt will be independent with initial HEP.  -GARRET     STG 1 Progress Met  -GARRET     STG 2 Pt will demonstrate good posture in sitting and standing to reduce strain on thoracic spine.  -GARRET     STG 2 Progress Met  -GARRET     STG 3 Pt will report decrease in pain from continuous to intermittent.  -GARRET     STG 3 Progress Met  -GARRET            Long Term Goals    LTG Date to Achieve 08/06/22  -GARRET     LTG 1 Pt will be independent with advanced HEP for spinal stabilization and core strength.  -GARRET     LTG 1 Progress Ongoing  -GARRET     LTG 2 Pt will demonstrate correct body mechanics with bending/lifting/reaching involved in ADLs and job to reduce strain/irritation of sympotsm.  -GARRET     LTG 2 Progress Met  -GARRET     LTG 3 Pt will report decrease in evening thoracic pain to 3/10 or less.  -GARRET     LTG 3 Progress Met  -GARRET     LTG 3 Progress Comments 2/10  -GARRET     LTG 4 Pt will socre 30% or less on GRACIELA indicating decrease in perceived functional disability.  -GARRET     LTG 4 Progress Met  -GARRET     LTG 5 Pt will demosntrate increased periscapular muscle strength to 4-/5 or better.  -GARRET     LTG 5 Progress Ongoing  -GARRET           User Key  (r) = Recorded By, (t) = Taken By, (c) = Cosigned By    Initials Name Provider Type    Libby Kyle, PT Physical Therapist                               Time Calculation:   Start Time: 1530  Stop Time: 1610  Time Calculation (min): 40 min  Timed Charges  39197 - PT Therapeutic Exercise Minutes: 40  Total Minutes  Timed Charges Total Minutes: 40   Total Minutes:  40  Therapy Charges for Today     Code Description Service Date Service Provider Modifiers Qty    57046969202  PT THER PROC EA 15 MIN 7/26/2022 Libby Vicente, PT GP 3                    Libby Vicente, PT  7/26/2022

## 2022-07-28 ENCOUNTER — APPOINTMENT (OUTPATIENT)
Dept: PHYSICAL THERAPY | Facility: HOSPITAL | Age: 32
End: 2022-07-28

## 2022-08-03 ENCOUNTER — OFFICE VISIT (OUTPATIENT)
Dept: ORTHOPEDIC SURGERY | Facility: CLINIC | Age: 32
End: 2022-08-03

## 2022-08-03 VITALS — HEIGHT: 73 IN | WEIGHT: 292.6 LBS | TEMPERATURE: 96.9 F | BODY MASS INDEX: 38.78 KG/M2

## 2022-08-03 DIAGNOSIS — M54.6 MIDLINE THORACIC BACK PAIN, UNSPECIFIED CHRONICITY: Primary | ICD-10-CM

## 2022-08-03 PROCEDURE — 99213 OFFICE O/P EST LOW 20 MIN: CPT | Performed by: ORTHOPAEDIC SURGERY

## 2022-08-03 NOTE — PROGRESS NOTES
New patient or new problem visit    CC: Thoracic back pain    HPI: He has had thoracic back pain since late March of this year.  He was bending over in the shower.  He managed to continue working at a [employer] while he underwent physical therapy and has improved significantly over time.  No numbness tingling weakness balance difficulties bowel or bladder complaints.    PFSH: See attached    ROS: As above    PE: BMI 38.6.  Good strength in the legs bilaterally.  He is a big maximino and BMI does not reflect that he appears predominantly muscular.  There remains a tiny area of tenderness in the midthoracic region on deep palpation but no palpable deformity.      XRAY: X-rays of the thoracic spine show mild spondylotic change at somewhat advanced for his age of 32.    Other: n/a    Impression: Thoracic strain, underlying mild thoracic spondylosis    Plan: He appears to be resolved I will see him back as needed.  Continue exercises and practice safe lifting techniques.

## 2022-08-16 ENCOUNTER — APPOINTMENT (OUTPATIENT)
Dept: PHYSICAL THERAPY | Facility: HOSPITAL | Age: 32
End: 2022-08-16

## 2022-10-26 ENCOUNTER — OFFICE VISIT (OUTPATIENT)
Dept: INTERNAL MEDICINE | Facility: CLINIC | Age: 32
End: 2022-10-26

## 2022-10-26 VITALS
SYSTOLIC BLOOD PRESSURE: 120 MMHG | OXYGEN SATURATION: 99 % | TEMPERATURE: 97.3 F | HEIGHT: 73 IN | RESPIRATION RATE: 16 BRPM | WEIGHT: 309 LBS | BODY MASS INDEX: 40.95 KG/M2 | DIASTOLIC BLOOD PRESSURE: 72 MMHG | HEART RATE: 68 BPM

## 2022-10-26 DIAGNOSIS — Z00.00 ENCOUNTER FOR PREVENTIVE HEALTH EXAMINATION: Primary | ICD-10-CM

## 2022-10-26 DIAGNOSIS — Z71.1 CONCERN ABOUT STD IN MALE WITHOUT DIAGNOSIS: ICD-10-CM

## 2022-10-26 PROCEDURE — 99395 PREV VISIT EST AGE 18-39: CPT | Performed by: INTERNAL MEDICINE

## 2022-10-26 RX ORDER — TERBINAFINE HYDROCHLORIDE 250 MG/1
250 TABLET ORAL DAILY
Qty: 30 TABLET | Refills: 2 | Status: SHIPPED | OUTPATIENT
Start: 2022-10-26

## 2022-10-26 NOTE — PROGRESS NOTES
Subjective   Gallito Doyle is a 32 y.o. male.     Chief Complaint   Patient presents with   • Annual Exam     Patient states that he is doing well.          History of Present Illness  In today for annual preventive exam.  Sleep is okay.  Gets 5-6 hours per night.  Exercises 2-3 days/week.  Energy is good.  Diet is not the best.       The following portions of the patient's history were reviewed and updated as appropriate: allergies, current medications, past social history and problem list.    No outpatient medications have been marked as taking for the 10/26/22 encounter (Office Visit) with Sincere Zhang MD.       Review of Systems   Constitutional: Negative for chills, diaphoresis, fatigue, fever and unexpected weight change.   Respiratory: Negative for cough, chest tightness, shortness of breath and wheezing.    Cardiovascular: Negative for chest pain, palpitations and leg swelling.   Gastrointestinal: Positive for abdominal distention (flatus). Negative for abdominal pain, anal bleeding, blood in stool, constipation, diarrhea, nausea, rectal pain and vomiting.   Endocrine: Negative for cold intolerance, heat intolerance and polyuria.   Genitourinary: Negative for difficulty urinating, dysuria, frequency, hematuria and urgency.   Musculoskeletal: Negative for arthralgias, back pain and myalgias.   Allergic/Immunologic: Positive for environmental allergies.   Neurological: Negative for dizziness, syncope, weakness, light-headedness and headaches.   Hematological: Negative for adenopathy. Does not bruise/bleed easily.   Psychiatric/Behavioral: Negative for confusion, dysphoric mood and sleep disturbance. The patient is not nervous/anxious.        Objective   Vitals:    10/26/22 1051   BP: 120/72   Pulse: 68   Resp: 16   Temp: 97.3 °F (36.3 °C)   SpO2: 99%      Wt Readings from Last 3 Encounters:   10/26/22 (!) 140 kg (309 lb)   08/03/22 133 kg (292 lb 9.6 oz)   05/23/22 136 kg (299 lb 12.8 oz)     Body mass index is 40.78 kg/m².          Problems Addressed this Visit    None  Visit Diagnoses     Encounter for preventive health examination    -  Primary    Relevant Orders    Glucose, Random    Lipid Panel With / Chol / HDL Ratio    Concern about STD in male without diagnosis        Relevant Orders    HIV-1 / O / 2 Ag / Antibody 4th Generation    RPR    Chlamydia trachomatis, Neisseria gonorrhoeae, PCR - Urine, Urine, Clean Catch      Diagnoses       Codes Comments    Encounter for preventive health examination    -  Primary ICD-10-CM: Z00.00  ICD-9-CM: V70.0     Concern about STD in male without diagnosis     ICD-10-CM: Z71.1  ICD-9-CM: V65.5         Assessment & Plan   In for annual preventive exam today October 2022.  Overall health is excellent.  He does smoke and is already accumulated a 14-pack-year smoking history.  Smokes about three quarters of a pack a day for 13 years.  We discussed quitting today.  He is not sure about last TD AP but had a child 4 years ago.  Flu shot is declined today.  Lab work today will include glucose and lipids.  He also would like some STD checking and we will send off a panel.  No specific exposures.  Just worries.  He has some onychomycosis on the right great toenail.  We will add Lamisil 250 mg daily for 3 months.  Liver function test every month on treatment we will plan to follow-up annually.    Prevention counseling was performed today. The counseling performed was routine health maintenance topics including BMI and exercise.    The above information was reviewed again today 10/26/22.  It continues to be accurate as reflected above and is unchanged.  History, physical and review of systems all reviewed and are unchanged.  Medications were reviewed today and continue the current dosing.    PPE today includes face mask and eye shield.         Dragon disclaimer:   Much of this encounter note is an electronic transcription/translation of spoken language to printed text. The  electronic translation of spoken language may permit erroneous, or at times, nonsensical words or phrases to be inadvertently transcribed; Although I have reviewed the note for such errors, some may still exist.

## 2022-10-28 LAB
ALBUMIN SERPL-MCNC: 4.6 G/DL (ref 3.5–5.2)
ALBUMIN/GLOB SERPL: 1.7 G/DL
ALP SERPL-CCNC: 53 U/L (ref 39–117)
ALT SERPL-CCNC: 25 U/L (ref 1–41)
AST SERPL-CCNC: 23 U/L (ref 1–40)
BILIRUB SERPL-MCNC: 0.4 MG/DL (ref 0–1.2)
BUN SERPL-MCNC: 9 MG/DL (ref 6–20)
BUN/CREAT SERPL: 10.1 (ref 7–25)
C TRACH RRNA SPEC QL NAA+PROBE: NEGATIVE
CALCIUM SERPL-MCNC: 9.6 MG/DL (ref 8.6–10.5)
CHLORIDE SERPL-SCNC: 102 MMOL/L (ref 98–107)
CHOLEST SERPL-MCNC: 177 MG/DL (ref 0–200)
CHOLEST/HDLC SERPL: 4.54 {RATIO}
CO2 SERPL-SCNC: 28.1 MMOL/L (ref 22–29)
CREAT SERPL-MCNC: 0.89 MG/DL (ref 0.76–1.27)
EGFRCR SERPLBLD CKD-EPI 2021: 116.8 ML/MIN/1.73
GLOBULIN SER CALC-MCNC: 2.7 GM/DL
GLUCOSE SERPL-MCNC: 87 MG/DL (ref 65–99)
HDLC SERPL-MCNC: 39 MG/DL (ref 40–60)
HIV 1+2 AB+HIV1 P24 AG SERPL QL IA: NON REACTIVE
LDLC SERPL CALC-MCNC: 106 MG/DL (ref 0–100)
N GONORRHOEA RRNA SPEC QL NAA+PROBE: NEGATIVE
POTASSIUM SERPL-SCNC: 4.4 MMOL/L (ref 3.5–5.2)
PROT SERPL-MCNC: 7.3 G/DL (ref 6–8.5)
RPR SER QL: NORMAL
SODIUM SERPL-SCNC: 141 MMOL/L (ref 136–145)
TRIGL SERPL-MCNC: 186 MG/DL (ref 0–150)
VLDLC SERPL CALC-MCNC: 32 MG/DL (ref 5–40)

## 2022-11-28 DIAGNOSIS — Z79.899 MEDICATION MANAGEMENT: Primary | ICD-10-CM

## 2022-11-29 ENCOUNTER — LAB (OUTPATIENT)
Dept: INTERNAL MEDICINE | Facility: CLINIC | Age: 32
End: 2022-11-29

## 2022-11-29 DIAGNOSIS — Z79.899 MEDICATION MANAGEMENT: ICD-10-CM

## 2022-11-30 LAB
ALBUMIN SERPL-MCNC: 4.7 G/DL (ref 3.5–5.2)
ALP SERPL-CCNC: 50 U/L (ref 39–117)
ALT SERPL-CCNC: 22 U/L (ref 1–41)
AST SERPL-CCNC: 20 U/L (ref 1–40)
BILIRUB DIRECT SERPL-MCNC: <0.2 MG/DL (ref 0–0.3)
BILIRUB SERPL-MCNC: <0.2 MG/DL (ref 0–1.2)
PROT SERPL-MCNC: 7.1 G/DL (ref 6–8.5)

## 2023-10-27 ENCOUNTER — OFFICE VISIT (OUTPATIENT)
Dept: INTERNAL MEDICINE | Facility: CLINIC | Age: 33
End: 2023-10-27
Payer: COMMERCIAL

## 2023-10-27 VITALS
RESPIRATION RATE: 18 BRPM | HEIGHT: 72 IN | TEMPERATURE: 98.9 F | HEART RATE: 78 BPM | WEIGHT: 315 LBS | BODY MASS INDEX: 42.66 KG/M2 | SYSTOLIC BLOOD PRESSURE: 132 MMHG | OXYGEN SATURATION: 99 % | DIASTOLIC BLOOD PRESSURE: 86 MMHG

## 2023-10-27 DIAGNOSIS — M25.561 CHRONIC PAIN OF RIGHT KNEE: ICD-10-CM

## 2023-10-27 DIAGNOSIS — Z71.1 CONCERN ABOUT STD IN MALE WITHOUT DIAGNOSIS: ICD-10-CM

## 2023-10-27 DIAGNOSIS — G89.29 CHRONIC PAIN OF RIGHT KNEE: ICD-10-CM

## 2023-10-27 DIAGNOSIS — Z00.00 ENCOUNTER FOR PREVENTIVE HEALTH EXAMINATION: Primary | ICD-10-CM

## 2023-10-27 PROCEDURE — 99395 PREV VISIT EST AGE 18-39: CPT | Performed by: INTERNAL MEDICINE

## 2023-10-27 RX ORDER — TERBINAFINE HYDROCHLORIDE 250 MG/1
250 TABLET ORAL DAILY
Qty: 30 TABLET | Refills: 2 | Status: SHIPPED | OUTPATIENT
Start: 2023-10-27 | End: 2023-10-27

## 2023-10-27 RX ORDER — FAMOTIDINE 20 MG/1
20 TABLET, FILM COATED ORAL 2 TIMES DAILY
Qty: 180 TABLET | Refills: 3 | Status: SHIPPED | OUTPATIENT
Start: 2023-10-27 | End: 2023-10-30 | Stop reason: SDUPTHER

## 2023-10-27 NOTE — PROGRESS NOTES
Subjective   Gallito Doyle is a 33 y.o. male.     Chief Complaint   Patient presents with    Annual Exam    Head Pressure         History of Present Illness  In today for annual preventive exam.  Sleep is okay.  Gets 6-7 hours per night.  Exercises 2-3 days/week.  Energy is good.  Diet is not the best.       The following portions of the patient's history were reviewed and updated as appropriate: allergies, current medications, past social history and problem list.    Outpatient Medications Marked as Taking for the 10/27/23 encounter (Office Visit) with Sincere Zhang MD   Medication Sig Dispense Refill    famotidine (Pepcid) 20 MG tablet Take 1 tablet by mouth 2 (Two) Times a Day. 180 tablet 3       Review of Systems   Constitutional:  Negative for chills, diaphoresis, fatigue, fever and unexpected weight change.   Respiratory:  Negative for cough, chest tightness, shortness of breath and wheezing.    Cardiovascular:  Negative for chest pain, palpitations and leg swelling.   Gastrointestinal:  Positive for abdominal pain (GERD controlled partially with Pepcid). Negative for abdominal distention (flatus), anal bleeding, blood in stool, constipation, diarrhea, nausea, rectal pain and vomiting.   Endocrine: Negative for cold intolerance, heat intolerance and polyuria.   Genitourinary:  Negative for difficulty urinating, dysuria, frequency, hematuria and urgency.   Musculoskeletal:  Negative for arthralgias, back pain and myalgias.   Allergic/Immunologic: Positive for environmental allergies.   Neurological:  Positive for headaches (Recent left temple sharp jabbing pains for 2 months have now resolved). Negative for dizziness, syncope, weakness and light-headedness.   Hematological:  Negative for adenopathy. Does not bruise/bleed easily.   Psychiatric/Behavioral:  Negative for confusion, dysphoric mood and sleep disturbance. The patient is not nervous/anxious.        Objective   Vitals:    10/27/23 1018    BP: 132/86   Pulse: 78   Resp: 18   Temp: 98.9 °F (37.2 °C)   SpO2: 99%      Wt Readings from Last 3 Encounters:   10/27/23 (!) 144 kg (317 lb)   10/26/22 (!) 140 kg (309 lb)   08/03/22 133 kg (292 lb 9.6 oz)    Body mass index is 42.99 kg/m².      Physical Exam  Vitals and nursing note reviewed.   Constitutional:       Appearance: Normal appearance. He is well-developed.   HENT:      Right Ear: Tympanic membrane and external ear normal.      Left Ear: Tympanic membrane and external ear normal.      Nose: Nose normal.   Eyes:      General: No scleral icterus.     Extraocular Movements: Extraocular movements intact.      Conjunctiva/sclera: Conjunctivae normal.      Pupils: Pupils are equal, round, and reactive to light.   Neck:      Thyroid: No thyromegaly.      Vascular: No JVD.   Cardiovascular:      Rate and Rhythm: Normal rate and regular rhythm.      Heart sounds: Normal heart sounds. No murmur heard.     No friction rub. No gallop.   Pulmonary:      Effort: Pulmonary effort is normal. No respiratory distress.      Breath sounds: Normal breath sounds. No wheezing or rales.   Abdominal:      General: Bowel sounds are normal. There is no distension.      Palpations: Abdomen is soft. There is no mass.      Tenderness: There is no abdominal tenderness. There is no guarding or rebound.      Hernia: No hernia is present.   Genitourinary:     Penis: Normal.       Testes: Normal.      Prostate: Normal.      Rectum: Normal.   Musculoskeletal:         General: Normal range of motion.      Cervical back: Normal range of motion and neck supple.   Lymphadenopathy:      Cervical: No cervical adenopathy.   Skin:     General: Skin is warm and dry.   Neurological:      General: No focal deficit present.      Mental Status: He is alert and oriented to person, place, and time.      Deep Tendon Reflexes: Reflexes are normal and symmetric. Reflexes normal.   Psychiatric:         Mood and Affect: Mood normal.         Behavior:  Behavior normal.         Thought Content: Thought content normal.         Judgment: Judgment normal.           Problems Addressed this Visit    None  Visit Diagnoses       Encounter for preventive health examination    -  Primary    Relevant Orders    CBC & Differential    Comprehensive Metabolic Panel    Lipid Panel With / Chol / HDL Ratio    Concern about STD in male without diagnosis        Relevant Orders    HIV-1 / O / 2 Ag / Antibody    RPR    Chlamydia trachomatis, Neisseria gonorrhoeae, PCR - Urine, Urine, Clean Catch          Diagnoses         Codes Comments    Encounter for preventive health examination    -  Primary ICD-10-CM: Z00.00  ICD-9-CM: V70.0     Concern about STD in male without diagnosis     ICD-10-CM: Z71.1  ICD-9-CM: V65.5           Assessment & Plan   In for annual preventive exam today October 2023.  Overall health is excellent.  He does smoke and is already accumulated a 14-pack-year smoking history.  Smoked about three quarters of a pack a day for 13 years.  About 1/2 pack/day for the last year starting 2022.  These are cigars, not cigarettes.  We discussed quitting today.  Flu shot is declined today.  Lab work today will include CBC, CMP, lipids, UA.  He also would like some STD checking and we will send off a panel.  No specific exposures.  Just worries.  He has some onychomycosis on the right great toenail.  We added Lamisil 250 mg daily for 3 months last year and that was only partially successful.  We will try a toenail lacquer this year.  Increase Pepcid to 20 mg twice daily from once daily for his chronic heartburn and hives.  He is already taking Claritin in the morning and Benadryl as needed.  We will increase Claritin to twice daily.  He is having right knee pain that seems to be on the lateral aspect of the knee.  We will start some physical therapy for him.  To avoid squats with his regular exercises and weight lifting.  He knows he should discontinue tobacco.  That is  specifically cigar use as he does not smoke cigarettes.  He knows he needs to lose weight.    Prevention counseling was performed today. The counseling performed was routine health maintenance topics including BMI and exercise.    The above information was reviewed again today 10/27/23.  It continues to be accurate as reflected above and is unchanged.  History, physical and review of systems all reviewed and are unchanged.  Medications were reviewed today and continue the current dosing.            Dragon disclaimer:   Part of this note may be an electronic transcription/translation of spoken language to printed text using the Dragon Dictation System.

## 2023-10-28 LAB
APPEARANCE UR: CLEAR
BACTERIA #/AREA URNS HPF: NORMAL /[HPF]
BILIRUB UR QL STRIP: NEGATIVE
CASTS URNS QL MICRO: NORMAL /LPF
COLOR UR: YELLOW
EPI CELLS #/AREA URNS HPF: NORMAL /HPF (ref 0–10)
GLUCOSE UR QL STRIP: NEGATIVE
HGB UR QL STRIP: NEGATIVE
KETONES UR QL STRIP: NEGATIVE
LEUKOCYTE ESTERASE UR QL STRIP: NEGATIVE
MICRO URNS: NORMAL
MICRO URNS: NORMAL
NITRITE UR QL STRIP: NEGATIVE
PH UR STRIP: 7.5 [PH] (ref 5–7.5)
PROT UR QL STRIP: NEGATIVE
RBC #/AREA URNS HPF: NORMAL /HPF (ref 0–2)
SP GR UR STRIP: 1.02 (ref 1–1.03)
UROBILINOGEN UR STRIP-MCNC: 1 MG/DL (ref 0.2–1)
WBC #/AREA URNS HPF: NORMAL /HPF (ref 0–5)

## 2023-10-30 ENCOUNTER — TELEPHONE (OUTPATIENT)
Dept: INTERNAL MEDICINE | Facility: CLINIC | Age: 33
End: 2023-10-30

## 2023-10-30 LAB
ALBUMIN SERPL-MCNC: 4.6 G/DL (ref 4.1–5.1)
ALBUMIN/GLOB SERPL: 2 {RATIO} (ref 1.2–2.2)
ALP SERPL-CCNC: 46 IU/L (ref 44–121)
ALT SERPL-CCNC: 18 IU/L (ref 0–44)
AST SERPL-CCNC: 17 IU/L (ref 0–40)
BASOPHILS # BLD AUTO: 0 X10E3/UL (ref 0–0.2)
BASOPHILS NFR BLD AUTO: 0 %
BILIRUB SERPL-MCNC: 0.5 MG/DL (ref 0–1.2)
BUN SERPL-MCNC: 8 MG/DL (ref 6–20)
BUN/CREAT SERPL: 9 (ref 9–20)
C TRACH RRNA SPEC QL NAA+PROBE: NEGATIVE
CALCIUM SERPL-MCNC: 9.6 MG/DL (ref 8.7–10.2)
CHLORIDE SERPL-SCNC: 102 MMOL/L (ref 96–106)
CHOLEST SERPL-MCNC: 189 MG/DL (ref 100–199)
CHOLEST/HDLC SERPL: 5.3 RATIO (ref 0–5)
CO2 SERPL-SCNC: 24 MMOL/L (ref 20–29)
CREAT SERPL-MCNC: 0.93 MG/DL (ref 0.76–1.27)
EGFRCR SERPLBLD CKD-EPI 2021: 111 ML/MIN/1.73
EOSINOPHIL # BLD AUTO: 0.2 X10E3/UL (ref 0–0.4)
EOSINOPHIL NFR BLD AUTO: 3 %
ERYTHROCYTE [DISTWIDTH] IN BLOOD BY AUTOMATED COUNT: 13.2 % (ref 11.6–15.4)
GLOBULIN SER CALC-MCNC: 2.3 G/DL (ref 1.5–4.5)
GLUCOSE SERPL-MCNC: 90 MG/DL (ref 70–99)
HCT VFR BLD AUTO: 45.8 % (ref 37.5–51)
HDLC SERPL-MCNC: 36 MG/DL
HGB BLD-MCNC: 15 G/DL (ref 13–17.7)
HIV 1+2 AB+HIV1 P24 AG SERPL QL IA: NON REACTIVE
IMM GRANULOCYTES # BLD AUTO: 0 X10E3/UL (ref 0–0.1)
IMM GRANULOCYTES NFR BLD AUTO: 0 %
LDLC SERPL CALC-MCNC: 118 MG/DL (ref 0–99)
LYMPHOCYTES # BLD AUTO: 2.3 X10E3/UL (ref 0.7–3.1)
LYMPHOCYTES NFR BLD AUTO: 28 %
MCH RBC QN AUTO: 28.2 PG (ref 26.6–33)
MCHC RBC AUTO-ENTMCNC: 32.8 G/DL (ref 31.5–35.7)
MCV RBC AUTO: 86 FL (ref 79–97)
MONOCYTES # BLD AUTO: 0.5 X10E3/UL (ref 0.1–0.9)
MONOCYTES NFR BLD AUTO: 6 %
N GONORRHOEA RRNA SPEC QL NAA+PROBE: NEGATIVE
NEUTROPHILS # BLD AUTO: 5 X10E3/UL (ref 1.4–7)
NEUTROPHILS NFR BLD AUTO: 63 %
PLATELET # BLD AUTO: 280 X10E3/UL (ref 150–450)
POTASSIUM SERPL-SCNC: 4.7 MMOL/L (ref 3.5–5.2)
PROT SERPL-MCNC: 6.9 G/DL (ref 6–8.5)
RBC # BLD AUTO: 5.31 X10E6/UL (ref 4.14–5.8)
RPR SER QL: NON REACTIVE
SODIUM SERPL-SCNC: 141 MMOL/L (ref 134–144)
TRIGL SERPL-MCNC: 201 MG/DL (ref 0–149)
VLDLC SERPL CALC-MCNC: 35 MG/DL (ref 5–40)
WBC # BLD AUTO: 8 X10E3/UL (ref 3.4–10.8)

## 2023-10-30 RX ORDER — FAMOTIDINE 20 MG/1
20 TABLET, FILM COATED ORAL 2 TIMES DAILY
Qty: 180 TABLET | Refills: 3 | Status: SHIPPED | OUTPATIENT
Start: 2023-10-30

## 2023-10-30 NOTE — TELEPHONE ENCOUNTER
Spoke to patient & advised that labs are still pending, only UA was the results that came back. Also, gave him info to [facility] to check on status of fungal polish script.

## 2023-10-30 NOTE — TELEPHONE ENCOUNTER
"    Caller: Gallito Doyle \"Quintin\"    Relationship: Self    Best call back number: [phone]     Requested Prescriptions:   Requested Prescriptions     Pending Prescriptions Disp Refills    famotidine (Pepcid) 20 MG tablet 180 tablet 3     Sig: Take 1 tablet by mouth 2 (Two) Times a Day.        Pharmacy where request should be sent: [id number] [address] [facility] [phone] [facility] [phone]      Last office visit with prescribing clinician: 10/27/2023   Last telemedicine visit with prescribing clinician: Visit date not found   Next office visit with prescribing clinician: 10/29/2024     Additional details provided by patient: PATIENT IS NEEDING THIS PRESCRIPTION SENT TO A DIFFERENT PHARMACY THAN IT WAS ORIGINALLY SENT TO.     Does the patient have less than a 3 day supply:  [x] Yes  [] No    Would you like a call back once the refill request has been completed: [] Yes [x] No    If the office needs to give you a call back, can they leave a voicemail: [] Yes [x] No    Kamari Mireles Rep   10/30/23 10:26 EDT           "

## 2023-10-30 NOTE — TELEPHONE ENCOUNTER
"    Caller: Gallito Doyle \"Quintin\"    Relationship: Self    Best call back number:[phone]       What test was performed: STD/LABS    When was the test performed: 10/27    Where was the test performed: OFFICE    Additional notes: SOME RESULTS ARE IN MYCHART BUT NOT ALL AND PATIENT WOULD LIKE A CALL TO GO OVER  "

## 2023-11-20 ENCOUNTER — HOSPITAL ENCOUNTER (OUTPATIENT)
Dept: PHYSICAL THERAPY | Facility: HOSPITAL | Age: 33
Setting detail: THERAPIES SERIES
Discharge: HOME | End: 2023-11-20
Payer: COMMERCIAL

## 2023-11-20 DIAGNOSIS — M25.561 CHRONIC PAIN OF RIGHT KNEE: Primary | ICD-10-CM

## 2023-11-20 DIAGNOSIS — G89.29 CHRONIC PAIN OF RIGHT KNEE: Primary | ICD-10-CM

## 2023-11-20 PROCEDURE — 97110 THERAPEUTIC EXERCISES: CPT

## 2023-11-20 PROCEDURE — 97161 PT EVAL LOW COMPLEX 20 MIN: CPT

## 2023-11-20 NOTE — THERAPY EVALUATION
"  Outpatient Physical Therapy Ortho Initial Evaluation  [facility]     Patient Name: Gallito Doyle  : 1990  MRN: [id number]  Today's Date: 2023      Visit Date: 2023    There is no problem list on file for this patient.       No past medical history on file.     No past surgical history on file.    Visit Dx:     ICD-10-CM ICD-9-CM   1. Chronic pain of right knee  M25.561 719.46    G89.29 338.29          Patient History       Row Name 23 1600             History    Chief Complaint Pain  -ER      Type of Pain Knee pain  -ER      Date Current Problem(s) Began --  2023  -ER      Brief Description of Current Complaint Gallito \"Roddie\" Vivek is a 33 year old male who presents to [facility] Outpatient [facility] Therapy for evaluation of chronic R knee pain. He reports that he has no GRACIELA, however he has started to notice persistent pain along the lateral border of his patella, and sometimes radiating into his popliteal fold. Pt. reports that he notices he has most pain when standing from a low sitting chair, getting into and out of a low sitting car, and going up steps. Pt. has been going to the gym and strengthening his RLE, which he reports has been helping. He is interested in a conservative exercise program to improve overall mechanics of R LE as well as reduce pain. Pt. enjoys hiking trails and his occupation requires him to navigate multiple stairs, both of which are limited by his R LE pain. Pt. has not had imaging done.  -ER      Patient/Caregiver Goals Relieve pain;Improve mobility  -ER      Occupation/sports/leisure activities Hiking trails, job requires him to go up and down steps  -ER      Patient seeing anyone else for problem(s)? N/A  -ER      How has patient tried to help current problem? N/A  -ER      Related/Recent Hospitalizations No  -ER         Pain     Pain Location Knee  -ER      Pain at Present 4  -ER      Pain at Best 2  -ER      Pain at Worst 5  -ER      What " Performance Factors Make the Current Problem(s) WORSE? Going up steps, getting up from a chair  -ER      What Performance Factors Make the Current Problem(s) BETTER? Unsure  -ER      Is your sleep disturbed? No  -ER      Difficulties at work? Difficulties with going up and down steps  -ER      Difficulties with ADL's? some pain with getting into and out of the car  -ER      Difficulties with recreational activities? Hiking  -ER         Fall Risk Assessment    Any falls in the past year: No  -ER         Services    Prior Rehab/Home Health Experiences No  -ER      Are you currently receiving Home Health services No  -ER      Do you plan to receive Home Health services in the near future No  -ER         Daily Activities    Primary Language English  -ER      How does patient learn best? Demonstration;Listening  -ER      Does patient have problems with the following? None  -ER      Pt Participated in POC and Goals Yes  -ER                User Key  (r) = Recorded By, (t) = Taken By, (c) = Cosigned By      Initials Name Provider Type    ER Lizett Gomez, PT Physical Therapist                     PT Ortho       Row Name 11/20/23 1600       Subjective    Subjective Comments Gallito Doyle is a 33 y.o. male who presents to physical therapy today with primary complaint of R knee pain that began June 2023. Gallito Doyle reports difficulty/increased pain with standing from low surfaces, getting into and out of a low sitting car, and going up stairs. Pt. Is unsure of pain relieving factors, however pt. Notes that he has been going to the gym to work on strengthening which has reduced some pain in the R knee. Gallito Doyle primary goal for attending skilled physical therapy is to reduce pain and improve overall functional mobility.  -ER       Subjective Pain    Able to rate subjective pain? yes  -ER    Pre-Treatment Pain Level 4  -ER       Posture/Observations    Posture- WNL Posture is WNL  -ER    Alignment Options  Genu valgus  -ER    Genu valgus Bilateral:;Mild  -ER       Quarter Clearing    Quarter Clearing Lower Quarter Clearing  -ER       DTR- Lower Quarter Clearing    Patellar tendon (L2-4) Right:;2- Normal response  -ER    Achilles tendon (S1-2) Right:;2- Normal response  -ER       Neural Tension Signs- Lower Quarter Clearing    Slump Negative  -ER    SLR Negative  -ER    Prone knee flexion Negative  -ER       Sensory Screen for Light Touch- Lower Quarter Clearing    L1 (inguinal area) Intact  -ER    L2 (anterior mid thigh) Intact  -ER    L3 (distal anterior thigh) Intact  -ER    L4 (medial lower leg/foot) Intact  -ER    L5 (lateral lower leg/great toe) Intact  -ER    S1 (bottom of foot) Intact  -ER       Myotomal Screen- Lower Quarter Clearing    Hip flexion (L2) 4+ (Good +)  -ER    Knee extension (L3) 4+ (Good +)  -ER    Ankle DF (L4) 5 (Normal)  -ER    Great toe extension (L5) 4+ (Good +)  -ER    Ankle PF (S1) 5 (Normal)  -ER    Knee flexion (S2) 4+ (Good +)  -ER       Lumbar ROM Screen- Lower Quarter Clearing    Lumbar Flexion Normal  -ER    Lumbar Extension Normal  -ER    Lumbar Lateral Flexion Normal  -ER    Lumbar Rotation Normal  -ER    Lumbar Quadrant  Normal  -ER       Special Tests/Palpation    Special Tests/Palpation Knee  -ER       Knee Palpation    Knee Palpation? Yes  -ER       Patellar Accessory Motions    Patellar Accessory Motions Tested? Yes  -ER       General ROM    RT Lower Ext Rt Knee Extension/Flexion  -ER       Right Lower Ext    Rt Knee Extension/Flexion AROM RLE lacking 2-115  -ER    Rt Knee Extension/Flexion PROM 0-120  -ER    RT Lower Extremity Comments Pain with terminal range knee flexion  -ER       MMT (Manual Muscle Testing)    Rt Lower Ext Rt Knee Extension;Rt Knee Flexion;Rt Hip Internal (Medial) Rotation;Rt Hip External (Lateral) Rotation;Rt Hip Flexion  -ER       MMT Right Lower Ext    Rt Hip Flexion MMT, Gross Movement (4+/5) good plus  -ER    Rt Hip Internal (Medial) Rotation MMT,  Gross Movement (4-/5) good minus  -ER    Rt Hip External (Lateral) Rotation MMT, Gross Movement (4-/5) good minus  -ER    Rt Knee Extension MMT, Gross Movement (4+/5) good plus  -ER    Rt Knee Flexion MMT, Gross Movement (4+/5) good plus  -ER    Rt Lower Extremity Comments  Pain with hip flexion and hip IR MMT  -ER       Sensation    Sensation WNL? WFL  -ER    Light Touch No apparent deficits  -ER    Sharp/Dull No apparent deficits  -ER       Flexibility    Flexibility Tested? Lower Extremity  -ER              User Key  (r) = Recorded By, (t) = Taken By, (c) = Cosigned By      Initials Name Provider Type    ER Lizett Gomez, PT Physical Therapist                                Therapy Education  Education Details: Initiated HEP, discussed POC and goals, discussed pain management techniques such as ice, discussed postural awareness and gait mechanics to reduce load on RLE.  Given: HEP, Symptoms/condition management, Pain management, Posture/body mechanics, Mobility training  Program: New  How Provided: Verbal, Demonstration  Provided to: Patient  Level of Understanding: Verbalized, Demonstrated      PT OP Goals       Row Name 11/20/23 1600          PT Short Term Goals    STG 1 Pt will be independent with initial HEP to improve strength/ROM and decrease pain.  -ER     STG 1 Progress New  -ER     STG 2 Pt will improve right knee AROM from 115 degrees to 120 degrees with minimal complaints of pain to improve ability to navigate stairs.  -ER     STG 2 Progress New  -ER     STG 3 Pt. will report <2/10 pain when standing from a low sitting chair/mat table to demonstrate improved functional mobility and pain tolerance  -ER     STG 3 Progress New  -ER        Long Term Goals    LTG 1 Pt will be independent with advance HEP to improve strength/ROM and decrease pain.  -ER     LTG 1 Progress New  -ER     LTG 2 Pt will be able to ascend/descend stairs in a reciprocal pattern with no increase pain.  -ER     LTG 2 Progress New  -ER      LTG 3 Pt will improve KOS score to 45/70 to show improved quality of life.  -ER     LTG 3 Progress New  -ER        Time Calculation    PT Goal Re-Cert Due Date 02/18/24  -ER               User Key  (r) = Recorded By, (t) = Taken By, (c) = Cosigned By      Initials Name Provider Type    ER Lizett Gomez, PT Physical Therapist                     PT Assessment/Plan       Row Name 11/20/23 1700          PT Assessment    Functional Limitations Limitations in community activities;Limitations in functional capacity and performance;Performance in leisure activities;Performance in work activities  -ER     Impairments Pain;Posture;Poor body mechanics;Muscle strength  -ER     Assessment Comments Gallito Doyle is a 33 y.o. male referred to physical therapy for evaluation of R knee pain. He presents with a stable clinical presentation, along with no remarkable comorbidities or personal factors that may impact his progress in the plan of care. Pt presents today with tenderness to palpation of the lateral R patella and popliteal space with end range knee flexion and slight genu valgus in standing . His signs and symptoms are consistent with referring diagnosis. The previous impairments limit his ability to perform STS from low sitting surfaces, navigate stairs, complete work related duties, and participate in leisure activities such as trail hiking. Patients KOS outcome measure, scores 54/70 where 0 is severe disability. Pt will benefit from skilled PT to address the previous impairments and return to PLOF.  -ER     Please refer to paper survey for additional self-reported information Yes  -ER     Rehab Potential Excellent  -ER     Patient/caregiver participated in establishment of treatment plan and goals Yes  -ER     Patient would benefit from skilled therapy intervention Yes  -ER        PT Plan    PT Frequency 1x/week  -ER     Predicted Duration of Therapy Intervention (PT) 4-6 visits  -ER     Planned CPT's? PT EVAL LOW  COMPLEXITY: 13047;PT THER ACT EA 15 MIN: 38044;PT THER PROC EA 15 MIN: 42275;PT MANUAL THERAPY EA 15 MIN: 15236;PT GAIT TRAINING EA 15 MIN: 95087;PT SELF CARE/HOME MGMT/TRAIN EA 15: 95840;PT ELECTRICAL STIM ATTD EA 15 MIN: 15683;PT HOT/COLD PACK WC NONMCARE: 28092;PT THER SUPP EA 15 MIN  -ER     PT Plan Comments Update HEP, Consider initiating nustep, stairs training, STS from various surfaces, Hip ABD/ADD, supine bridges? Discuss current workout regimen at gym?  -ER               User Key  (r) = Recorded By, (t) = Taken By, (c) = Cosigned By      Initials Name Provider Type    ER Lizett Gomez, PT Physical Therapist                       OP Exercises       Row Name 11/20/23 1600             Subjective    Subjective Comments Gallito Doyle is a 33 y.o. male who presents to physical therapy today with primary complaint of R knee pain that began June 2023. Gallito Doyle reports difficulty/increased pain with standing from low surfaces, getting into and out of a low sitting car, and going up stairs. Pt. Is unsure of pain relieving factors, however pt. Notes that he has been going to the gym to work on strengthening which has reduced some pain in the R knee. Gallito Doyle primary goal for attending skilled physical therapy is to reduce pain and improve overall functional mobility.  -ER         Subjective Pain    Able to rate subjective pain? yes  -ER      Pre-Treatment Pain Level 4  -ER      Post-Treatment Pain Level 4  -ER         Total Minutes    77513 - PT Therapeutic Exercise Minutes 10  -ER         Exercise 1    Exercise Name 1 PT evaluation - discussed POC, HEP, pain management techniques, postural awareness  -ER         Exercise 2    Exercise Name 2 Nustep  -ER      Additional Comments Initiate at f/u  -ER         Exercise 3    Exercise Name 3 QS  -ER      Cueing 3 Verbal;Demo  -ER      Sets 3 1  -ER      Reps 3 15  -ER      Additional Comments RLE  -ER         Exercise 4    Exercise Name 4 LAQ  -ER       Cueing 4 Verbal;Demo  -ER      Sets 4 2  -ER      Reps 4 10  -ER      Time 4 3sec  -ER      Additional Comments bw  -ER         Exercise 5    Exercise Name 5 QS+SLR  -ER      Cueing 5 Verbal;Demo  -ER      Sets 5 1  -ER      Reps 5 15  -ER         Exercise 6    Exercise Name 6 SAQ  -ER      Cueing 6 Verbal;Demo  -ER      Sets 6 1  -ER      Reps 6 15  -ER      Additional Comments 2# AW  -ER                User Key  (r) = Recorded By, (t) = Taken By, (c) = Cosigned By      Initials Name Provider Type    ER Lizett Gomez, PT Physical Therapist                                  Outcome Measure Options: Knee Outcome Score- ADL  Knee Outcome Survey Activities of Daily Living Scale  Symptoms: Pain: The symptom affects my activity slightly  Symptoms: Stiffness: I have the symptom, but it does not affect my activity  Symptoms: Swelling: I do not have the symptom  Symptoms: Giving way, buckling, or shifting of the knee: I do not have the symptom  Symptoms: Weakness: I have the symptom, but it does not affect my activity  Symptoms: Limping: I do not have the symptom  Functional Limitations with ADL's: Walk: Activity is not difficult  Functional Limitations with ADL's: Go up stairs: Activity is somewhat difficult  Functional Limitations with ADL's: Go down stairs: Activity is not difficult  Functional Limitations with ADL's: Stand: Activity is minimally difficult  Functional Limitations with ADL's: Kneel on front of your knee: Activity is somewhat difficult  Functional Limitations with ADL's: Squat: Activity is somewhat difficult  Functional Limitations with ADL's: Sit with your knee bent: Activity is fairly difficult  Functional Limitations with ADL's: Rise from a chair: Activity is somewhat difficult  Knee Outcome Summary ADL's Score: 77.14 %  Knee Outcome Score Comments: 54/70      Time Calculation:     Start Time: 1600  Stop Time: 1640  Time Calculation (min): 40 min  Total Timed Code Minutes- PT: 10 minute(s)  Timed  Charges  75032 - PT Therapeutic Exercise Minutes: 10  Untimed Charges  PT Eval/Re-eval Minutes: 30  Total Minutes  Timed Charges Total Minutes: 10  Untimed Charges Total Minutes: 30   Total Minutes: 40     Therapy Charges for Today       Code Description Service Date Service Provider Modifiers Qty    84429060543 HC PT THER PROC EA 15 MIN 11/20/2023 Lizett Gomez, PT GP 1    03888107503 HC PT EVAL LOW COMPLEXITY 2 11/20/2023 Lizett Gomez, PT GP 1            PT G-Codes  Outcome Measure Options: Knee Outcome Score- ADL         Lizett Gomez, PT  11/20/2023

## 2023-11-27 ENCOUNTER — HOSPITAL ENCOUNTER (OUTPATIENT)
Dept: PHYSICAL THERAPY | Facility: HOSPITAL | Age: 33
Setting detail: THERAPIES SERIES
Discharge: HOME | End: 2023-11-27
Payer: COMMERCIAL

## 2023-11-27 DIAGNOSIS — M25.561 CHRONIC PAIN OF RIGHT KNEE: Primary | ICD-10-CM

## 2023-11-27 DIAGNOSIS — G89.29 CHRONIC PAIN OF RIGHT KNEE: Primary | ICD-10-CM

## 2023-11-27 PROCEDURE — 97110 THERAPEUTIC EXERCISES: CPT | Performed by: PHYSICAL THERAPIST

## 2023-11-27 NOTE — THERAPY TREATMENT NOTE
Outpatient Physical Therapy Ortho Treatment Note  [facility]     Patient Name: Gallito Doyle  : 1990  MRN: [id number]  Today's Date: 2023      Visit Date: 2023    Visit Dx:    ICD-10-CM ICD-9-CM   1. Chronic pain of right knee  M25.561 719.46    G89.29 338.29       There is no problem list on file for this patient.       No past medical history on file.     No past surgical history on file.                     PT Assessment/Plan       Row Name 23 1509          PT Assessment    Assessment Comments Mr. Doyle returns to the clinic for his first follow up PT session for his R (lateral) knee pain.  He describes pain in a ITB disturbution and demosntrates TTP R posterior/lateral hip girdle tissue with (+) trigger points and TTP along course of R ITB. Session somewhat limited secondary to arrival time. We did update his HEP, including hip girdle strengtheing and hip girdle stretching. He is enocuraged to ice his knee after work. Suspect primary  is ITB syndrome. We did discuss benefits/risks of DDN if he should be interested. Strengthening to be once every other day, stretches daily. mr. Doyle continues to fang good candidate for skilled physical therapy  -GJ        PT Plan    PT Plan Comments assess resonse to stretches/strengthening. See exercise section for ideas for new exercises focusing on hip girdle strength.  STM vs. DDN to R posterior lateral hip girdle tissue as needed.  -               User Key  (r) = Recorded By, (t) = Taken By, (c) = Cosigned By      Initials Name Provider Type    Arun Morgan, PT Physical Therapist                       OP Exercises       Row Name 23 1503 23 1500          Subjective    Subjective Comments -- doing ok  -GJ        Total Minutes    60268 - PT Therapeutic Exercise Minutes 27  -GJ --        Exercise 2    Exercise Name 2 -- recumbent bike  -GJ     Time 2 -- 5 min  -     Additional Comments -- seat 10  -         Exercise 4    Exercise Name 4 -- LAQ  -GJ     Additional Comments -- next  -GJ        Exercise 5    Exercise Name 5 -- QS+SLR  -GJ     Cueing 5 -- Verbal;Demo  -GJ     Sets 5 -- 2  -GJ     Reps 5 -- 15  -GJ     Time 5 -- B  -GJ        Exercise 6    Exercise Name 6 -- SAQ  -GJ     Additional Comments -- next  -GJ        Exercise 7    Exercise Name 7 -- SL hip abd  -GJ     Cueing 7 -- Verbal;Demo  -GJ     Sets 7 -- 2  -GJ     Reps 7 -- 10  -GJ     Time 7 -- B  -GJ     Additional Comments -- RTB  -GJ        Exercise 8    Exercise Name 8 -- step down  -GJ     Additional Comments -- next  -GJ        Exercise 9    Exercise Name 9 -- step up  -GJ     Additional Comments -- next  -GJ        Exercise 10    Exercise Name 10 -- piriformis stretch  -GJ     Cueing 10 -- Verbal;Demo  -GJ     Reps 10 -- 3  -GJ     Time 10 -- 20s  -GJ     Additional Comments -- also demonstated in sitting  -GJ        Exercise 11    Exercise Name 11 -- seated HS stretch  -GJ     Cueing 11 -- Verbal;Demo  -GJ     Reps 11 -- 3  -GJ     Time 11 -- 20s  -GJ        Exercise 12    Exercise Name 12 -- SL clam, B  -GJ     Cueing 12 -- Verbal;Demo  -GJ     Reps 12 -- 12  -GJ     Time 12 -- RTB, B  -GJ        Exercise 13    Exercise Name 13 -- lateral stepping  -GJ     Additional Comments -- next  -GJ               User Key  (r) = Recorded By, (t) = Taken By, (c) = Cosigned By      Initials Name Provider Type    GJ Arun Santana, PT Physical Therapist                                  PT OP Goals       Row Name 11/27/23 1500          PT Short Term Goals    STG 1 Pt will be independent with initial HEP to improve strength/ROM and decrease pain.  -GJ     STG 1 Progress Ongoing  -GJ     STG 2 Pt will improve right knee AROM from 115 degrees to 120 degrees with minimal complaints of pain to improve ability to navigate stairs.  -GJ     STG 2 Progress Ongoing  -GJ     STG 3 Pt. will report <2/10 pain when standing from a low sitting chair/mat table to demonstrate  improved functional mobility and pain tolerance  -GJ     STG 3 Progress Ongoing  -GJ        Long Term Goals    LTG 1 Pt will be independent with advance HEP to improve strength/ROM and decrease pain.  -GJ     LTG 1 Progress Ongoing  -GJ     LTG 2 Pt will be able to ascend/descend stairs in a reciprocal pattern with no increase pain.  -GJ     LTG 2 Progress Ongoing  -GJ     LTG 3 Pt will improve KOS score to 45/70 to show improved quality of life.  -GJ     LTG 3 Progress Ongoing  -GJ               User Key  (r) = Recorded By, (t) = Taken By, (c) = Cosigned By      Initials Name Provider Type     Arun Santana, PT Physical Therapist                    Therapy Education  Education Details: [id number], reviewed current gym activities, encouraged to hold on knee extension machine. discussed risks/benefits of DDN.  Encouraged pt to ice knee once to twice a day, 10-15 min.  Given: HEP, Symptoms/condition management, Pain management, Posture/body mechanics, Mobility training, Edema management  Program: Reinforced, New, Progressed  How Provided: Demonstration, Verbal  Provided to: Patient  Level of Understanding: Verbalized, Teach back education performed, Demonstrated              Time Calculation:   Start Time: 1503 (appt time 1445)  Stop Time: 1530  Time Calculation (min): 27 min  Timed Charges  72313 - PT Therapeutic Exercise Minutes: 27  Total Minutes  Timed Charges Total Minutes: 27   Total Minutes: 27  Therapy Charges for Today       Code Description Service Date Service Provider Modifiers Qty    01539687466 HC PT THER PROC EA 15 MIN 11/27/2023 Arun Santana, PT GP 2                      Arun Santana PT  11/27/2023

## 2023-12-11 ENCOUNTER — HOSPITAL ENCOUNTER (OUTPATIENT)
Dept: PHYSICAL THERAPY | Facility: HOSPITAL | Age: 33
Setting detail: THERAPIES SERIES
Discharge: HOME | End: 2023-12-11
Payer: COMMERCIAL

## 2023-12-11 DIAGNOSIS — M25.561 CHRONIC PAIN OF RIGHT KNEE: Primary | ICD-10-CM

## 2023-12-11 DIAGNOSIS — G89.29 CHRONIC PAIN OF RIGHT KNEE: Primary | ICD-10-CM

## 2023-12-11 PROCEDURE — 97110 THERAPEUTIC EXERCISES: CPT | Performed by: PHYSICAL THERAPIST

## 2023-12-11 NOTE — THERAPY TREATMENT NOTE
Outpatient Physical Therapy Ortho Treatment Note  [facility]     Patient Name: Gallito Doyle  : 1990  MRN: [id number]  Today's Date: 2023      Visit Date: 2023    Visit Dx:    ICD-10-CM ICD-9-CM   1. Chronic pain of right knee  M25.561 719.46    G89.29 338.29       There is no problem list on file for this patient.       No past medical history on file.     No past surgical history on file.                     PT Assessment/Plan       Row Name 23 1456          PT Assessment    Assessment Comments Mr. Doyle returns to the clinic for his right lateral knee pain.  Appointment time was 1445 he arrived at 1457. He returns reporting improvement in his R knee pain, noting ease with stairs.  We progressed his hip girdle/core strengthenig activities today, updating his HEP somewhat with strengthening to be once every other day, stretches daily.  Reviewed risks/benefits of DDN yoko case he decides this is an option he would like to pursue DDN of the R hip girdle tissue.  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  Mr. Doyle continues to be a candidate for skilled physical therapy.  -GJ        PT Plan    PT Plan Comments assess response to new exercises and trip to [place]. Continue to work on hip girdle and core strengthening.  ? RDL vs. split squat, Deep tissue work to R posterior/lateral hip girdle vs. DDN to same, assess need for additional sessions after 1/3/2024 vs. transition to independent management  -               User Key  (r) = Recorded By, (t) = Taken By, (c) = Cosigned By      Initials Name Provider Type    Arun Morgan, PT Physical Therapist                       OP Exercises       Row Name 23 1456 23 1400          Subjective    Subjective Comments -- my knee is feeliing better  -GJ        Total Minutes    96928 - PT Therapeutic Exercise Minutes 30  -GJ --        Exercise 2    Exercise Name 2 -- recumbent bike  -     Cueing 2 --  Verbal;Demo  -GJ     Time 2 -- 5 min  -GJ     Additional Comments -- seat 10  -GJ        Exercise 3    Exercise Name 3 -- STS (tap chair)  -GJ     Cueing 3 -- Verbal;Demo  -GJ     Sets 3 -- 2  -GJ     Reps 3 -- 10  -GJ     Time 3 -- 8# DB in goblet hold  -GJ     Additional Comments -- standard chair (grey)  -GJ        Exercise 8    Exercise Name 8 -- step down  -GJ     Cueing 8 -- Verbal;Demo  -GJ     Sets 8 -- 2  -GJ     Reps 8 -- 10  -GJ     Time 8 -- 6 inch, B  -GJ        Exercise 9    Exercise Name 9 -- step up  -GJ     Cueing 9 -- Verbal;Tactile  -GJ     Sets 9 -- 2  -GJ     Reps 9 -- 10  -GJ     Time 9 -- 6 inch  -GJ     Additional Comments -- forward/lateral, B  -GJ        Exercise 10    Exercise Name 10 -- piriformis stretch  -GJ     Cueing 10 -- Verbal;Demo  -GJ     Reps 10 -- 3  -GJ     Time 10 -- 20s  -GJ     Additional Comments -- seated  -GJ        Exercise 11    Exercise Name 11 -- seated HS stretch  -GJ     Cueing 11 -- Verbal;Demo  -GJ     Reps 11 -- 3  -GJ     Time 11 -- 20s  -GJ        Exercise 12    Exercise Name 12 -- SL clam, B  -GJ     Cueing 12 -- Verbal;Demo  -GJ     Reps 12 -- 2 x 12  -GJ     Time 12 -- GTB, B  -GJ        Exercise 13    Exercise Name 13 -- lateral stepping  -GJ     Reps 13 -- 4  laps  -GJ     Additional Comments -- RTB  -GJ        Exercise 14    Exercise Name 14 -- bridge with LE extension  -GJ     Cueing 14 -- Verbal;Demo  -GJ     Sets 14 -- 2  -GJ     Reps 14 -- 10  -GJ               User Key  (r) = Recorded By, (t) = Taken By, (c) = Cosigned By      Initials Name Provider Type    GJ Arun Santana W, PT Physical Therapist                                  PT OP Goals       Row Name 12/11/23 1400          PT Short Term Goals    STG 1 Pt will be independent with initial HEP to improve strength/ROM and decrease pain.  -GJ     STG 1 Progress Met  -GJ     STG 2 Pt will improve right knee AROM from 115 degrees to 120 degrees with minimal complaints of pain to improve ability to  navigate stairs.  -GJ     STG 2 Progress Ongoing  -GJ     STG 3 Pt. will report <2/10 pain when standing from a low sitting chair/mat table to demonstrate improved functional mobility and pain tolerance  -GJ     STG 3 Progress Ongoing  -GJ        Long Term Goals    LTG 1 Pt will be independent with advance HEP to improve strength/ROM and decrease pain.  -GJ     LTG 1 Progress Ongoing  -GJ     LTG 2 Pt will be able to ascend/descend stairs in a reciprocal pattern with no increase pain.  -GJ     LTG 2 Progress Ongoing  -GJ     LTG 3 Pt will improve KOS score to 45/70 to show improved quality of life.  -GJ     LTG 3 Progress Ongoing  -GJ               User Key  (r) = Recorded By, (t) = Taken By, (c) = Cosigned By      Initials Name Provider Type    Arun Morgan, PT Physical Therapist                    Therapy Education  Education Details: [id number].  reviewed activity modifications. Issued new exercises for home, just don't complete until after trip to [place]  Given: HEP, Symptoms/condition management, Pain management, Posture/body mechanics, Fall prevention and home safety, Mobility training  Program: Reinforced, New, Progressed  How Provided: Verbal, Demonstration, Written  Provided to: Patient  Level of Understanding: Teach back education performed, Verbalized, Demonstrated              Time Calculation:   Start Time: 1457 (appt time 1445)  Stop Time: 1530  Time Calculation (min): 33 min  Timed Charges  16279 - PT Therapeutic Exercise Minutes: 30  Total Minutes  Timed Charges Total Minutes: 30   Total Minutes: 30  Therapy Charges for Today       Code Description Service Date Service Provider Modifiers Qty    44647638583 HC PT THER PROC EA 15 MIN 12/11/2023 Arun Santana, PT GP 2                      Arun Santana, PT  12/11/2023

## 2023-12-18 ENCOUNTER — HOSPITAL ENCOUNTER (OUTPATIENT)
Dept: PHYSICAL THERAPY | Facility: HOSPITAL | Age: 33
Setting detail: THERAPIES SERIES
Discharge: HOME | End: 2023-12-18
Payer: COMMERCIAL

## 2023-12-18 DIAGNOSIS — G89.29 CHRONIC PAIN OF RIGHT KNEE: Primary | ICD-10-CM

## 2023-12-18 DIAGNOSIS — M25.561 CHRONIC PAIN OF RIGHT KNEE: Primary | ICD-10-CM

## 2023-12-18 PROCEDURE — 97110 THERAPEUTIC EXERCISES: CPT

## 2023-12-18 PROCEDURE — 97140 MANUAL THERAPY 1/> REGIONS: CPT

## 2023-12-18 NOTE — THERAPY TREATMENT NOTE
Outpatient Physical Therapy Ortho Treatment Note  [facility]     Patient Name: Gallito Doyle  : 1990  MRN: [id number]  Today's Date: 2023      Visit Date: 2023    Visit Dx:    ICD-10-CM ICD-9-CM   1. Chronic pain of right knee  M25.561 719.46    G89.29 338.29       There is no problem list on file for this patient.       No past medical history on file.     No past surgical history on file.                     PT Assessment/Plan       Row Name 23 1200          PT Assessment    Assessment Comments Mr. Doyle returns to PT after returning from [facility] trip late last night. He reports mild RLE soreness following last session that lead to some discomfort while navigating stairs while on his trip. He continues to demo taut posterolateral hip girdle muscle leading to ITB tension. Therefore, initiated deep tissue massage to R hip girdle and patient reports relief in tightness immediately following. Reviewed potential benefit of DDN to R hip girdle next session in attempt to reduce R lateral knee pain. Added retro monsters this date with good tolerance. Mr. Doyle remains a candidate for skilled PT.  -GARRET        PT Plan    PT Plan Comments consider DDN to R posterolateral hip girdle prior to transitioning to independent management, potentially add lateral stepping into lateral lunge + AR press  -GARRET               User Key  (r) = Recorded By, (t) = Taken By, (c) = Cosigned By      Initials Name Provider Type    Libby Kyle, PT Physical Therapist                       OP Exercises       Row Name 23 1200             Subjective    Subjective Comments I was a little sore after last time but overall doing okay. I had some pain with stairs while in [facility]  -GARRET         Total Minutes    60009 - PT Therapeutic Exercise Minutes 28  -GARRET      41453 - PT Manual Therapy Minutes 10  -GARRET         Exercise 2    Exercise Name 2 recumbent bike  -GARRET      Cueing 2 Verbal;Demo  -GARRET      Time 2 5  min  -GARRET      Additional Comments seat 10  -GARRET         Exercise 7    Exercise Name 7 SL hip abd  -GARRET      Cueing 7 Verbal;Demo  -GARRET      Sets 7 2  -GARRET      Reps 7 10  -GARRET      Time 7 B  -GARRET      Additional Comments RTB  -GARRET         Exercise 8    Exercise Name 8 step down  -GARRET      Cueing 8 Verbal;Demo  -GARRET      Sets 8 2  -GARRET      Reps 8 10  -GARRET      Time 8 6 inch, B  -GARRET         Exercise 9    Exercise Name 9 step up  -GARRET      Cueing 9 Verbal;Tactile  -GARRET      Sets 9 2  -GARRET      Reps 9 10  -GARRET      Time 9 6 inch  -GARRET      Additional Comments lateral  -GARRET         Exercise 10    Exercise Name 10 piriformis stretch  -GARRET      Cueing 10 Verbal;Demo  -GARRET      Reps 10 3  -GARRET      Time 10 20s  -GARRET      Additional Comments seated  -GARRET         Exercise 11    Exercise Name 11 seated HS stretch  -GARRET      Cueing 11 Verbal;Demo  -GARRET      Reps 11 3  -GARRET      Time 11 20s  -GARRET         Exercise 12    Exercise Name 12 SL clam, B  -GARERT      Cueing 12 Verbal;Demo  -GARRET      Reps 12 2 x 12  -GARRET      Time 12 GTB, B  -GARRET         Exercise 13    Exercise Name 13 lateral stepping  -GARRET      Reps 13 4  laps  -GARRET         Exercise 14    Exercise Name 14 bridge with LE extension  -GARRET      Cueing 14 Verbal;Demo  -GARRET      Sets 14 2  -GARRET      Reps 14 10  -GARRET         Exercise 15    Exercise Name 15 retro monster  -GARRET      Cueing 15 Verbal;Demo  -GARRET      Reps 15 4 laps  -GARRET      Time 15 GTB  -GARRET                User Key  (r) = Recorded By, (t) = Taken By, (c) = Cosigned By      Initials Name Provider Type    Libby Kyle, PT Physical Therapist                             Manual Rx (last 36 hours)       Manual Treatments       Row Name 12/18/23 1200             Total Minutes    25284 - PT Manual Therapy Minutes 10  -GARRET         Manual Rx 1    Manual Rx 1 Location R posterolateral hip girdle  -GARRET      Manual Rx 1 Type deep tissue massage  -GARRET      Manual Rx 1 Grade PT elbow  -GARRET      Manual Rx 1 Duration L SLing with pillow between knees  -AGRRET                 User Key  (r) = Recorded By, (t) = Taken By, (c) = Cosigned By      Initials Name Provider Type    Libby Kyle PT Physical Therapist                     PT OP Goals       Row Name 12/18/23 1300          PT Short Term Goals    STG 1 Pt will be independent with initial HEP to improve strength/ROM and decrease pain.  -     STG 1 Progress Met  -     STG 2 Pt will improve right knee AROM from 115 degrees to 120 degrees with minimal complaints of pain to improve ability to navigate stairs.  -     STG 2 Progress Ongoing  -GARRET     STG 3 Pt. will report <2/10 pain when standing from a low sitting chair/mat table to demonstrate improved functional mobility and pain tolerance  -     STG 3 Progress Ongoing  -GARRET        Long Term Goals    LTG 1 Pt will be independent with advance HEP to improve strength/ROM and decrease pain.  -     LTG 1 Progress Ongoing  -GARRET     LTG 2 Pt will be able to ascend/descend stairs in a reciprocal pattern with no increase pain.  -     LTG 2 Progress Ongoing  -GARRET     LTG 3 Pt will improve KOS score to 45/70 to show improved quality of life.  -     LTG 3 Progress Ongoing  -GARRET               User Key  (r) = Recorded By, (t) = Taken By, (c) = Cosigned By      Initials Name Provider Type    Libby Kyle PT Physical Therapist                    Therapy Education  Education Details: steps for DDN, risks/benefits and expectations following DDN  Given: Symptoms/condition management, Pain management  Program: Reinforced  How Provided: Verbal, Demonstration  Provided to: Patient  Level of Understanding: Verbalized, Demonstrated              Time Calculation:   Start Time: 1222  Stop Time: 1300  Time Calculation (min): 38 min  Timed Charges  12339 - PT Therapeutic Exercise Minutes: 28  44070 - PT Manual Therapy Minutes: 10  Total Minutes  Timed Charges Total Minutes: 38   Total Minutes: 38  Therapy Charges for Today       Code Description Service Date  Service Provider Modifiers Qty    08902799420  PT THER PROC EA 15 MIN 12/18/2023 Libby Vicente, PT GP 2    [id number]  PT MANUAL THERAPY EA 15 MIN 12/18/2023 Libby Vicente, PT GP 1                      Libby Vicente, PT  12/18/2023

## 2024-10-29 ENCOUNTER — OFFICE VISIT (OUTPATIENT)
Dept: INTERNAL MEDICINE | Facility: CLINIC | Age: 34
End: 2024-10-29
Payer: COMMERCIAL

## 2024-10-29 ENCOUNTER — LAB (OUTPATIENT)
Dept: LAB | Facility: HOSPITAL | Age: 34
End: 2024-10-29
Payer: COMMERCIAL

## 2024-10-29 VITALS
SYSTOLIC BLOOD PRESSURE: 118 MMHG | DIASTOLIC BLOOD PRESSURE: 82 MMHG | HEART RATE: 89 BPM | BODY MASS INDEX: 42.66 KG/M2 | OXYGEN SATURATION: 98 % | TEMPERATURE: 97.5 F | HEIGHT: 72 IN | WEIGHT: 315 LBS

## 2024-10-29 DIAGNOSIS — Z72.0 TOBACCO USE: ICD-10-CM

## 2024-10-29 DIAGNOSIS — Z71.1 CONCERN ABOUT STD IN MALE WITHOUT DIAGNOSIS: ICD-10-CM

## 2024-10-29 DIAGNOSIS — Z00.00 ENCOUNTER FOR PREVENTIVE HEALTH EXAMINATION: Primary | ICD-10-CM

## 2024-10-29 LAB
ALBUMIN SERPL-MCNC: 4 G/DL (ref 3.5–5.2)
ALBUMIN/GLOB SERPL: 1.2 G/DL
ALP SERPL-CCNC: 48 U/L (ref 39–117)
ALT SERPL W P-5'-P-CCNC: 24 U/L (ref 1–41)
ANION GAP SERPL CALCULATED.3IONS-SCNC: 8.8 MMOL/L (ref 5–15)
AST SERPL-CCNC: 22 U/L (ref 1–40)
BASOPHILS # BLD AUTO: 0.04 10*3/MM3 (ref 0–0.2)
BASOPHILS NFR BLD AUTO: 0.5 % (ref 0–1.5)
BILIRUB SERPL-MCNC: 0.3 MG/DL (ref 0–1.2)
BUN SERPL-MCNC: 9 MG/DL (ref 6–20)
BUN/CREAT SERPL: 10 (ref 7–25)
CALCIUM SPEC-SCNC: 9.5 MG/DL (ref 8.6–10.5)
CHLORIDE SERPL-SCNC: 105 MMOL/L (ref 98–107)
CHOLEST SERPL-MCNC: 197 MG/DL (ref 0–200)
CO2 SERPL-SCNC: 26.2 MMOL/L (ref 22–29)
CREAT SERPL-MCNC: 0.9 MG/DL (ref 0.76–1.27)
DEPRECATED RDW RBC AUTO: 43 FL (ref 37–54)
EGFRCR SERPLBLD CKD-EPI 2021: 114.9 ML/MIN/1.73
EOSINOPHIL # BLD AUTO: 0.3 10*3/MM3 (ref 0–0.4)
EOSINOPHIL NFR BLD AUTO: 3.5 % (ref 0.3–6.2)
ERYTHROCYTE [DISTWIDTH] IN BLOOD BY AUTOMATED COUNT: 13.5 % (ref 12.3–15.4)
GLOBULIN UR ELPH-MCNC: 3.3 GM/DL
GLUCOSE SERPL-MCNC: 110 MG/DL (ref 65–99)
HCT VFR BLD AUTO: 44.5 % (ref 37.5–51)
HDLC SERPL QL: 6.16
HDLC SERPL-MCNC: 32 MG/DL (ref 40–60)
HGB BLD-MCNC: 14.6 G/DL (ref 13–17.7)
HIV 1+2 AB+HIV1 P24 AG SERPL QL IA: NORMAL
IMM GRANULOCYTES # BLD AUTO: 0.04 10*3/MM3 (ref 0–0.05)
IMM GRANULOCYTES NFR BLD AUTO: 0.5 % (ref 0–0.5)
LDLC SERPL CALC-MCNC: 101 MG/DL (ref 0–100)
LYMPHOCYTES # BLD AUTO: 2.26 10*3/MM3 (ref 0.7–3.1)
LYMPHOCYTES NFR BLD AUTO: 26.1 % (ref 19.6–45.3)
MCH RBC QN AUTO: 28.6 PG (ref 26.6–33)
MCHC RBC AUTO-ENTMCNC: 32.8 G/DL (ref 31.5–35.7)
MCV RBC AUTO: 87.1 FL (ref 79–97)
MONOCYTES # BLD AUTO: 0.61 10*3/MM3 (ref 0.1–0.9)
MONOCYTES NFR BLD AUTO: 7 % (ref 5–12)
NEUTROPHILS NFR BLD AUTO: 5.42 10*3/MM3 (ref 1.7–7)
NEUTROPHILS NFR BLD AUTO: 62.4 % (ref 42.7–76)
NRBC BLD AUTO-RTO: 0 /100 WBC (ref 0–0.2)
PLATELET # BLD AUTO: 261 10*3/MM3 (ref 140–450)
PMV BLD AUTO: 10.5 FL (ref 6–12)
POTASSIUM SERPL-SCNC: 3.9 MMOL/L (ref 3.5–5.2)
PROT SERPL-MCNC: 7.3 G/DL (ref 6–8.5)
RBC # BLD AUTO: 5.11 10*6/MM3 (ref 4.14–5.8)
RPR SER QL: NORMAL
SODIUM SERPL-SCNC: 140 MMOL/L (ref 136–145)
TRIGL SERPL-MCNC: 376 MG/DL (ref 0–150)
VLDLC SERPL-MCNC: 64 MG/DL (ref 5–40)
WBC NRBC COR # BLD AUTO: 8.67 10*3/MM3 (ref 3.4–10.8)

## 2024-10-29 PROCEDURE — 80061 LIPID PANEL: CPT | Performed by: INTERNAL MEDICINE

## 2024-10-29 PROCEDURE — G0432 EIA HIV-1/HIV-2 SCREEN: HCPCS | Performed by: INTERNAL MEDICINE

## 2024-10-29 PROCEDURE — 87591 N.GONORRHOEAE DNA AMP PROB: CPT | Performed by: INTERNAL MEDICINE

## 2024-10-29 PROCEDURE — 36415 COLL VENOUS BLD VENIPUNCTURE: CPT | Performed by: INTERNAL MEDICINE

## 2024-10-29 PROCEDURE — 85025 COMPLETE CBC W/AUTO DIFF WBC: CPT | Performed by: INTERNAL MEDICINE

## 2024-10-29 PROCEDURE — 87491 CHLMYD TRACH DNA AMP PROBE: CPT | Performed by: INTERNAL MEDICINE

## 2024-10-29 PROCEDURE — 86592 SYPHILIS TEST NON-TREP QUAL: CPT | Performed by: INTERNAL MEDICINE

## 2024-10-29 PROCEDURE — 80053 COMPREHEN METABOLIC PANEL: CPT | Performed by: INTERNAL MEDICINE

## 2024-10-29 PROCEDURE — 99395 PREV VISIT EST AGE 18-39: CPT | Performed by: INTERNAL MEDICINE

## 2024-10-29 RX ORDER — TERBINAFINE HYDROCHLORIDE 250 MG/1
250 TABLET ORAL DAILY
Qty: 30 TABLET | Refills: 2 | Status: SHIPPED | OUTPATIENT
Start: 2024-10-29

## 2024-10-29 RX ORDER — FAMOTIDINE 20 MG/1
20 TABLET, FILM COATED ORAL 2 TIMES DAILY
Qty: 180 TABLET | Refills: 3 | Status: SHIPPED | OUTPATIENT
Start: 2024-10-29

## 2024-10-29 NOTE — PROGRESS NOTES
Subjective   Gallito Doyle is a 34 y.o. male.     Chief Complaint   Patient presents with    Annual Exam         History of Present Illness  In today for annual preventive exam.  Sleep is okay.  Gets 6 hours per night.  Exercises 0 days/week.  Energy is good.  Diet is not the best.       The following portions of the patient's history were reviewed and updated as appropriate: allergies, current medications, past social history and problem list.    Outpatient Medications Marked as Taking for the 10/29/24 encounter (Office Visit) with Sincere Zhang MD   Medication Sig Dispense Refill    famotidine (Pepcid) 20 MG tablet Take 1 tablet by mouth 2 (Two) Times a Day. 180 tablet 3       Review of Systems   Constitutional:  Negative for chills, diaphoresis, fatigue, fever and unexpected weight change.   Respiratory:  Negative for cough, chest tightness, shortness of breath and wheezing.    Cardiovascular:  Negative for chest pain, palpitations and leg swelling.   Gastrointestinal:  Positive for abdominal pain (GERD controlled partially with Pepcid). Negative for abdominal distention (flatus), anal bleeding, blood in stool, constipation, diarrhea, nausea, rectal pain and vomiting.   Endocrine: Negative for cold intolerance, heat intolerance and polyuria.   Genitourinary:  Negative for difficulty urinating, dysuria, frequency, hematuria and urgency.   Musculoskeletal:  Negative for arthralgias, back pain and myalgias.   Allergic/Immunologic: Positive for environmental allergies.   Neurological:  Negative for dizziness, syncope, weakness, light-headedness and headaches (Recent left temple sharp jabbing pains for 2 months have now resolved).   Hematological:  Negative for adenopathy. Does not bruise/bleed easily.   Psychiatric/Behavioral:  Negative for confusion, dysphoric mood and sleep disturbance. The patient is not nervous/anxious.        Objective   Vitals:    10/29/24 1030   BP: 118/82   Pulse: 89   Temp:  97.5 °F (36.4 °C)   SpO2: 98%      Wt Readings from Last 3 Encounters:   10/29/24 (!) 151 kg (332 lb)   10/27/23 (!) 144 kg (317 lb)   10/26/22 (!) 140 kg (309 lb)    Body mass index is 45.03 kg/m².      Physical Exam  Vitals and nursing note reviewed.   Constitutional:       Appearance: Normal appearance. He is well-developed.   HENT:      Head: Normocephalic and atraumatic.      Right Ear: Tympanic membrane and external ear normal.      Left Ear: Tympanic membrane and external ear normal.      Nose: Nose normal.   Eyes:      General: No scleral icterus.     Extraocular Movements: Extraocular movements intact.      Conjunctiva/sclera: Conjunctivae normal.      Pupils: Pupils are equal, round, and reactive to light.   Neck:      Thyroid: No thyromegaly.      Vascular: No JVD.   Cardiovascular:      Rate and Rhythm: Normal rate and regular rhythm.      Heart sounds: Normal heart sounds. No murmur heard.     No friction rub. No gallop.   Pulmonary:      Effort: Pulmonary effort is normal. No respiratory distress.      Breath sounds: Normal breath sounds. No wheezing or rales.   Abdominal:      General: Bowel sounds are normal. There is no distension.      Palpations: Abdomen is soft. There is no mass.      Tenderness: There is no abdominal tenderness. There is no guarding or rebound.      Hernia: No hernia is present. There is no hernia in the left inguinal area or right inguinal area.   Genitourinary:     Testes: Normal.   Musculoskeletal:         General: Normal range of motion.      Cervical back: Normal range of motion and neck supple.   Lymphadenopathy:      Cervical: No cervical adenopathy.   Skin:     General: Skin is warm and dry.   Neurological:      General: No focal deficit present.      Mental Status: He is alert and oriented to person, place, and time.      Deep Tendon Reflexes: Reflexes are normal and symmetric. Reflexes normal.   Psychiatric:         Mood and Affect: Mood normal.         Behavior:  Behavior normal.         Thought Content: Thought content normal.         Judgment: Judgment normal.           Problems Addressed this Visit    None  Visit Diagnoses       Encounter for preventive health examination    -  Primary    Tobacco use              Diagnoses         Codes Comments    Encounter for preventive health examination    -  Primary ICD-10-CM: Z00.00  ICD-9-CM: V70.0     Tobacco use     ICD-10-CM: Z72.0  ICD-9-CM: 305.1           Assessment & Plan   In for annual preventive exam today October 2024.  Overall health is excellent.  He does smoke and is already accumulated a 15-pack-year smoking history.  Smoked about three quarters of a pack a day for 13 years.  About 1 pack/day for the last year starting 2022.  These are cigars, not cigarettes.  There are 5 cigars to a pack we discussed quitting today.  Flu shot is declined today.  Lab work today will include CBC, CMP, lipids, UA.  He also would like some STD checking and we will send off a panel.  No specific exposures.  Just worries.  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  Will try it again.  Treatment will be 3 months 250 mg once daily.  He will recheck some liver function test in 4 to 6 weeks.  Increase Pepcid to 20 mg twice daily from once daily for his chronic heartburn and hives.  He is already taking Claritin in the morning and Benadryl as needed.  He knows he should discontinue tobacco.  That is specifically cigar use as he does not smoke cigarettes.  He knows he needs to lose weight.  He is 3 HPP today.    Prevention counseling was performed today. The counseling performed was routine health maintenance topics including BMI and exercise.    The above information was reviewed again today 10/29/24.  It continues to be accurate as reflected above and is unchanged.  History, physical and review of systems all reviewed and are unchanged.  Medications were reviewed today and continue the current dosing.              Tom  disclaimer:   Part of this note may be an electronic transcription/translation of spoken language to printed text using the Dragon Dictation System.

## 2024-10-31 LAB
C TRACH RRNA SPEC QL NAA+PROBE: NEGATIVE
N GONORRHOEA RRNA SPEC QL NAA+PROBE: NEGATIVE

## 2024-11-11 ENCOUNTER — PATIENT ROUNDING (BHMG ONLY) (OUTPATIENT)
Dept: INTERNAL MEDICINE | Facility: CLINIC | Age: 34
End: 2024-11-11
Payer: COMMERCIAL

## 2024-11-11 NOTE — PROGRESS NOTES
November 11, 2024    Hello, may I speak with Gallito Doyle?    My name is Mary Alcaraz      [facility]  [facility]  [facility] [address] [address] [phone].    Before we get started may I verify your date of birth? 1990    I am calling to officially welcome you to our practice and ask about your recent visit. Is this a good time to talk? yes    Tell me about your visit with us. What things went well?  great       We're always looking for ways to make our patients' experiences even better. Do you have recommendations on ways we may improve?  no    Overall were you satisfied with your first visit to our practice? yes       I appreciate you taking the time to speak with me today. Is there anything else I can do for you? no      Thank you, and have a great day.

## 2025-06-24 ENCOUNTER — OFFICE VISIT (OUTPATIENT)
Dept: INTERNAL MEDICINE | Facility: CLINIC | Age: 35
End: 2025-06-24
Payer: COMMERCIAL

## 2025-06-24 VITALS
OXYGEN SATURATION: 98 % | SYSTOLIC BLOOD PRESSURE: 146 MMHG | BODY MASS INDEX: 42.66 KG/M2 | WEIGHT: 315 LBS | RESPIRATION RATE: 18 BRPM | DIASTOLIC BLOOD PRESSURE: 90 MMHG | HEART RATE: 82 BPM | TEMPERATURE: 98.3 F | HEIGHT: 72 IN

## 2025-06-24 DIAGNOSIS — I10 ESSENTIAL HYPERTENSION: Primary | ICD-10-CM

## 2025-06-24 PROCEDURE — 99214 OFFICE O/P EST MOD 30 MIN: CPT | Performed by: INTERNAL MEDICINE

## 2025-06-24 RX ORDER — VARENICLINE TARTRATE 0.5 (11)-1
KIT ORAL
Qty: 1 EACH | Refills: 0 | Status: SHIPPED | OUTPATIENT
Start: 2025-06-24 | End: 2025-07-22

## 2025-06-24 RX ORDER — VARENICLINE TARTRATE 1 MG/1
1 TABLET, FILM COATED ORAL 2 TIMES DAILY
Qty: 56 TABLET | Refills: 1 | Status: SHIPPED | OUTPATIENT
Start: 2025-07-22 | End: 2025-09-16

## 2025-06-24 RX ORDER — VALSARTAN 80 MG/1
80 TABLET ORAL DAILY
Qty: 90 TABLET | Refills: 1 | Status: SHIPPED | OUTPATIENT
Start: 2025-06-24

## 2025-06-24 NOTE — PROGRESS NOTES
Subjective   Gallito Doyle is a 34 y.o. male.     Chief Complaint   Patient presents with    Hypertension         History of Present Illness  Insert recheck blood pressure today.  Riddhi has been taking his blood pressure at home over the past few days he is consistently running 150-160/.  Hypertension  Chronicity:  New  Onset:  In the past 7 days  Associated symptoms: no chest pain, no headaches, no palpitations and no shortness of breath         The following portions of the patient's history were reviewed and updated as appropriate: allergies, current medications, past social history and problem list.    Outpatient Medications Marked as Taking for the 6/24/25 encounter (Office Visit) with Sincere Zhang MD   Medication Sig Dispense Refill    famotidine (Pepcid) 20 MG tablet Take 1 tablet by mouth 2 (Two) Times a Day. 180 tablet 3       Review of Systems   Respiratory:  Negative for shortness of breath.    Cardiovascular:  Negative for chest pain, palpitations and leg swelling.   Neurological:  Negative for headaches.       Objective   Vitals:    06/24/25 0913   BP: 146/90   Pulse: 82   Resp: 18   Temp: 98.3 °F (36.8 °C)   SpO2: 98%      Wt Readings from Last 3 Encounters:   06/24/25 (!) 145 kg (320 lb)   10/29/24 (!) 151 kg (332 lb)   10/27/23 (!) 144 kg (317 lb)    Body mass index is 43.4 kg/m².      Physical Exam  Constitutional:       Appearance: Normal appearance.   Cardiovascular:      Rate and Rhythm: Normal rate and regular rhythm.      Heart sounds: Normal heart sounds. No murmur heard.     No gallop.   Pulmonary:      Effort: No respiratory distress.      Breath sounds: Normal breath sounds. No wheezing or rales.   Neurological:      Mental Status: He is alert.           Problems Addressed this Visit    None  Visit Diagnoses         Essential hypertension    -  Primary          Diagnoses         Codes Comments      Essential hypertension    -  Primary ICD-10-CM: I10  ICD-9-CM: 401.9            Assessment & Plan   In today with elevated blood pressure readings over the past 2 days.  Blood pressure is normal the last 2 years with his annual preventative exams.  He has been consistently running 150-160/ with home readings over the past 2 days.  Completely asymptomatic.  He is on no supplements.  Blood pressure is little elevated today.  We discussed restriction of salt.  Weight control is also important for blood pressure.  He thinks his weight is up in the past year but actually his weight has been pretty stable over the past 2 years.  He knows to get rid of the saltshaker and to cut the sodium in his recipes in half.  Avoid eating out as much as possible and avoid canned items.  Will start on valsartan 80 mg daily today and recheck blood pressure in 1 month.  He smokes about three quarters of a pack a day and is trying to quit.  Will start on Chantix and see if it helps.    The above information was reviewed again today 06/24/25.  It continues to be accurate as reflected above and is unchanged.  History, physical and review of systems all reviewed and are unchanged.  Medications were reviewed today and continue the current dosing.               Dragon disclaimer:   Part of this note may be an electronic transcription/translation of spoken language to printed text using the Dragon Dictation System.